# Patient Record
Sex: MALE | Race: BLACK OR AFRICAN AMERICAN | NOT HISPANIC OR LATINO | URBAN - METROPOLITAN AREA
[De-identification: names, ages, dates, MRNs, and addresses within clinical notes are randomized per-mention and may not be internally consistent; named-entity substitution may affect disease eponyms.]

---

## 2018-07-29 ENCOUNTER — INPATIENT (INPATIENT)
Facility: HOSPITAL | Age: 68
LOS: 1 days | Discharge: HOME | End: 2018-07-31
Attending: INTERNAL MEDICINE | Admitting: INTERNAL MEDICINE
Payer: MEDICARE

## 2018-07-29 VITALS
TEMPERATURE: 98 F | HEIGHT: 75 IN | SYSTOLIC BLOOD PRESSURE: 150 MMHG | RESPIRATION RATE: 24 BRPM | DIASTOLIC BLOOD PRESSURE: 90 MMHG | WEIGHT: 179.9 LBS | HEART RATE: 100 BPM

## 2018-07-29 DIAGNOSIS — Z29.9 ENCOUNTER FOR PROPHYLACTIC MEASURES, UNSPECIFIED: ICD-10-CM

## 2018-07-29 DIAGNOSIS — J44.1 CHRONIC OBSTRUCTIVE PULMONARY DISEASE WITH (ACUTE) EXACERBATION: ICD-10-CM

## 2018-07-29 DIAGNOSIS — Z72.0 TOBACCO USE: ICD-10-CM

## 2018-07-29 LAB
ALBUMIN SERPL ELPH-MCNC: 3.6 G/DL — SIGNIFICANT CHANGE UP (ref 3.3–5)
ALP SERPL-CCNC: 64 U/L — SIGNIFICANT CHANGE UP (ref 40–120)
ALT FLD-CCNC: 39 U/L — SIGNIFICANT CHANGE UP (ref 12–78)
ANION GAP SERPL CALC-SCNC: 13 MMOL/L — SIGNIFICANT CHANGE UP (ref 5–17)
APTT BLD: 34.3 SEC — SIGNIFICANT CHANGE UP (ref 27.5–37.4)
AST SERPL-CCNC: 43 U/L — HIGH (ref 15–37)
BASE EXCESS BLDA CALC-SCNC: -0.9 MMOL/L — SIGNIFICANT CHANGE UP (ref -2–2)
BASOPHILS # BLD AUTO: 0.05 K/UL — SIGNIFICANT CHANGE UP (ref 0–0.2)
BASOPHILS NFR BLD AUTO: 0.5 % — SIGNIFICANT CHANGE UP (ref 0–2)
BILIRUB SERPL-MCNC: 0.4 MG/DL — SIGNIFICANT CHANGE UP (ref 0.2–1.2)
BLOOD GAS COMMENTS: SIGNIFICANT CHANGE UP
BLOOD GAS COMMENTS: SIGNIFICANT CHANGE UP
BLOOD GAS SOURCE: SIGNIFICANT CHANGE UP
BUN SERPL-MCNC: 23 MG/DL — SIGNIFICANT CHANGE UP (ref 7–23)
CALCIUM SERPL-MCNC: 8.7 MG/DL — SIGNIFICANT CHANGE UP (ref 8.5–10.1)
CHLORIDE SERPL-SCNC: 106 MMOL/L — SIGNIFICANT CHANGE UP (ref 96–108)
CK MB CFR SERPL CALC: 5.7 NG/ML — HIGH (ref 0.5–3.6)
CO2 SERPL-SCNC: 22 MMOL/L — SIGNIFICANT CHANGE UP (ref 22–31)
CREAT SERPL-MCNC: 0.96 MG/DL — SIGNIFICANT CHANGE UP (ref 0.5–1.3)
EOSINOPHIL # BLD AUTO: 0.66 K/UL — HIGH (ref 0–0.5)
EOSINOPHIL NFR BLD AUTO: 6.8 % — HIGH (ref 0–6)
GLUCOSE SERPL-MCNC: 108 MG/DL — HIGH (ref 70–99)
HCO3 BLDA-SCNC: 23 MMOL/L — SIGNIFICANT CHANGE UP (ref 21–29)
HCT VFR BLD CALC: 43.1 % — SIGNIFICANT CHANGE UP (ref 39–50)
HGB BLD-MCNC: 14.2 G/DL — SIGNIFICANT CHANGE UP (ref 13–17)
HOROWITZ INDEX BLDA+IHG-RTO: 21 — SIGNIFICANT CHANGE UP
IMM GRANULOCYTES NFR BLD AUTO: 0.3 % — SIGNIFICANT CHANGE UP (ref 0–1.5)
INR BLD: 1.05 RATIO — SIGNIFICANT CHANGE UP (ref 0.88–1.16)
LYMPHOCYTES # BLD AUTO: 1.79 K/UL — SIGNIFICANT CHANGE UP (ref 1–3.3)
LYMPHOCYTES # BLD AUTO: 18.5 % — SIGNIFICANT CHANGE UP (ref 13–44)
MCHC RBC-ENTMCNC: 30.5 PG — SIGNIFICANT CHANGE UP (ref 27–34)
MCHC RBC-ENTMCNC: 32.9 GM/DL — SIGNIFICANT CHANGE UP (ref 32–36)
MCV RBC AUTO: 92.5 FL — SIGNIFICANT CHANGE UP (ref 80–100)
MONOCYTES # BLD AUTO: 0.92 K/UL — HIGH (ref 0–0.9)
MONOCYTES NFR BLD AUTO: 9.5 % — SIGNIFICANT CHANGE UP (ref 2–14)
NEUTROPHILS # BLD AUTO: 6.24 K/UL — SIGNIFICANT CHANGE UP (ref 1.8–7.4)
NEUTROPHILS NFR BLD AUTO: 64.4 % — SIGNIFICANT CHANGE UP (ref 43–77)
NT-PROBNP SERPL-SCNC: 104 PG/ML — SIGNIFICANT CHANGE UP (ref 0–125)
PCO2 BLDA: 40 MMHG — SIGNIFICANT CHANGE UP (ref 32–46)
PH BLD: 7.39 — SIGNIFICANT CHANGE UP (ref 7.35–7.45)
PLATELET # BLD AUTO: 225 K/UL — SIGNIFICANT CHANGE UP (ref 150–400)
PO2 BLDA: 76 MMHG — SIGNIFICANT CHANGE UP (ref 74–108)
POTASSIUM SERPL-MCNC: 3.9 MMOL/L — SIGNIFICANT CHANGE UP (ref 3.5–5.3)
POTASSIUM SERPL-SCNC: 3.9 MMOL/L — SIGNIFICANT CHANGE UP (ref 3.5–5.3)
PROT SERPL-MCNC: 6.8 GM/DL — SIGNIFICANT CHANGE UP (ref 6–8.3)
PROTHROM AB SERPL-ACNC: 11.5 SEC — SIGNIFICANT CHANGE UP (ref 9.8–12.7)
RBC # BLD: 4.66 M/UL — SIGNIFICANT CHANGE UP (ref 4.2–5.8)
RBC # FLD: 15.2 % — HIGH (ref 10.3–14.5)
SAO2 % BLDA: 94 % — SIGNIFICANT CHANGE UP (ref 92–96)
SODIUM SERPL-SCNC: 141 MMOL/L — SIGNIFICANT CHANGE UP (ref 135–145)
TROPONIN I SERPL-MCNC: <.015 NG/ML — SIGNIFICANT CHANGE UP (ref 0.01–0.04)
WBC # BLD: 9.69 K/UL — SIGNIFICANT CHANGE UP (ref 3.8–10.5)
WBC # FLD AUTO: 9.69 K/UL — SIGNIFICANT CHANGE UP (ref 3.8–10.5)

## 2018-07-29 PROCEDURE — 12345: CPT

## 2018-07-29 PROCEDURE — 99223 1ST HOSP IP/OBS HIGH 75: CPT

## 2018-07-29 PROCEDURE — 93010 ELECTROCARDIOGRAM REPORT: CPT

## 2018-07-29 PROCEDURE — 99291 CRITICAL CARE FIRST HOUR: CPT

## 2018-07-29 PROCEDURE — 71045 X-RAY EXAM CHEST 1 VIEW: CPT | Mod: 26

## 2018-07-29 RX ORDER — HEPARIN SODIUM 5000 [USP'U]/ML
5000 INJECTION INTRAVENOUS; SUBCUTANEOUS EVERY 8 HOURS
Qty: 0 | Refills: 0 | Status: DISCONTINUED | OUTPATIENT
Start: 2018-07-29 | End: 2018-07-31

## 2018-07-29 RX ORDER — NICOTINE POLACRILEX 2 MG
1 GUM BUCCAL DAILY
Qty: 0 | Refills: 0 | Status: DISCONTINUED | OUTPATIENT
Start: 2018-07-29 | End: 2018-07-31

## 2018-07-29 RX ORDER — IPRATROPIUM/ALBUTEROL SULFATE 18-103MCG
3 AEROSOL WITH ADAPTER (GRAM) INHALATION ONCE
Qty: 0 | Refills: 0 | Status: COMPLETED | OUTPATIENT
Start: 2018-07-29 | End: 2018-07-29

## 2018-07-29 RX ORDER — MAGNESIUM SULFATE 500 MG/ML
2 VIAL (ML) INJECTION ONCE
Qty: 0 | Refills: 0 | Status: COMPLETED | OUTPATIENT
Start: 2018-07-29 | End: 2018-07-29

## 2018-07-29 RX ORDER — IPRATROPIUM/ALBUTEROL SULFATE 18-103MCG
3 AEROSOL WITH ADAPTER (GRAM) INHALATION EVERY 6 HOURS
Qty: 0 | Refills: 0 | Status: DISCONTINUED | OUTPATIENT
Start: 2018-07-29 | End: 2018-07-31

## 2018-07-29 RX ADMIN — Medication 3 MILLILITER(S): at 12:10

## 2018-07-29 RX ADMIN — Medication 3 MILLILITER(S): at 23:19

## 2018-07-29 RX ADMIN — HEPARIN SODIUM 5000 UNIT(S): 5000 INJECTION INTRAVENOUS; SUBCUTANEOUS at 13:01

## 2018-07-29 RX ADMIN — Medication 40 MILLIGRAM(S): at 17:08

## 2018-07-29 RX ADMIN — Medication 1 PATCH: at 12:52

## 2018-07-29 RX ADMIN — HEPARIN SODIUM 5000 UNIT(S): 5000 INJECTION INTRAVENOUS; SUBCUTANEOUS at 21:03

## 2018-07-29 RX ADMIN — Medication 40 MILLIGRAM(S): at 09:04

## 2018-07-29 RX ADMIN — Medication 3 MILLILITER(S): at 05:14

## 2018-07-29 RX ADMIN — Medication 50 GRAM(S): at 05:24

## 2018-07-29 RX ADMIN — Medication 3 MILLILITER(S): at 17:11

## 2018-07-29 NOTE — ED ADULT NURSE NOTE - CHPI ED SYMPTOMS POS
COUGH/labored breathing,came in on Bipap, awake alert oriented/WHEEZING/CHEST CONGESTION/SHORTNESS OF BREATH

## 2018-07-29 NOTE — CONSULT NOTE ADULT - SUBJECTIVE AND OBJECTIVE BOX
Patient is a 68y old  Male who presents with a chief complaint of COPD exacerbation (29 Jul 2018 07:16)    HPI:  68 year male with HTN, COPD, Active tobacco abuse since age 26, Marijuana abuse.  presented  to ED with complaint of wheezing and SOB with cough.  Ddenies fever, chills, recent travel, sick contacts, prior intubations, recent hospitalizations.  He says he was told his lung issues were the result of toxic exposures at first but was then diagnosed with COPD by [place] PM[place]      PAST MEDICAL & SURGICAL HISTORY:  Hypertension, unspecified type  Chronic obstructive pulmonary disease, unspecified COPD type  No significant past surgical history    FAMILY HISTORY:  No pertinent family history in first degree relatives    SOCIAL HISTORY: BMI (kg/m2): 23.1 . active smoker.    Allergies  No Known Allergies    MEDICATIONS  (STANDING):  ALBUTerol/ipratropium for Nebulization 3 milliLiter(s) Nebulizer every 6 hours  heparin  Injectable 5000 Unit(s) SubCutaneous every 8 hours  methylPREDNISolone sodium succinate Injectable 40 milliGRAM(s) IV Push every 8 hours  nicotine -  14 mG/24Hr(s) Patch 1 patch Transdermal daily    REVIEW OF SYSTEMS:    Constitutional:            No fever, weight loss or fatigue  HEENT:                       No difficulty hearing, tinnitus, vertigo; No sinus or throat pain  Respiratory:                 sob and cough  Cardiovascular:           No chest pain, palpitations  Gastrointestinal:        No abdominal or epigastric pain. No N/V/diarrhea or hematemesis  Genitourinary:            No dysuria, frequency, hematuria or incontinence  SKIN:                             no rash  Musculoskeletal:        No joint pain or swelling  Extremities:                No swelling  Neurological:              No headaches  Psychiatric:                 No depression, anxiety    MACRA & MIPS;  Vaccines - Influenza: yes and Pneumovax: Yes  BMI:         normal  Tobacco:    yes  Blood Presssure Screening / Control of:  151/82  Current Medications Reviewed:    Vital Signs Last 24 Hrs  T(C): 36.7 (29 Jul 2018 09:47), Max: 36.7 (29 Jul 2018 04:47)  T(F): 98 (29 Jul 2018 09:47), Max: 98.1 (29 Jul 2018 04:47)  HR: 86 (29 Jul 2018 09:47) (70 - 100)  BP: 150/92 (29 Jul 2018 09:47) (145/78 - 151/82)  BP(mean): --  RR: 20 (29 Jul 2018 09:47) (14 - 24)  SpO2: 96% (29 Jul 2018 09:47) (95% - 98%)    PHYSICAL EXAM:  GEN:         Awake, responsive and comfortable.  HEENT:    Normal.    RESP:     decreased air entry.  CVS:          Regular rate and rhythm.   ABD:         Soft, non-tender, non-distended;   :             No costovertebral angle tenderness  SKIN:           Warm and dry.  EXTR:            No clubbing, cyanosis or edema  CNS:              Intact sensory and motor function.  PSYCH:        cooperative, no anxiety or depression    LABS:                        14.2   9.69  )-----------( 225      ( 29 Jul 2018 05:17 )             43.1     07-29    141  |  106  |  23  ----------------------------<  108<H>  3.9   |  22  |  0.96    Ca    8.7      29 Jul 2018 06:13    TPro  6.8  /  Alb  3.6  /  TBili  0.4  /  DBili  x   /  AST  43<H>  /  ALT  39  /  AlkPhos  64  07-29    PT/INR - ( 29 Jul 2018 06:13 )   PT: 11.5 sec;   INR: 1.05 ratio      PTT - ( 29 Jul 2018 06:13 )  PTT:34.3 sec  07-29 @ 05:12  pH: 7.39  pCO2: 40  pO2: 76  SaO2: 94    EKG:  Sinus rhythm, RBBBB    RADIOLOGY & ADDITIONAL STUDIES:    ASSESSMENT AND PLAN:  ·	SOB.  ·	Acute COPD exacerbation.  ·	Active tobacco abuse.  ·	Marijuana abuse.  ·	HTN.    Continue nebulizer and steroids.  Counselled for smoking cessation.  PFT out pt.

## 2018-07-29 NOTE — H&P ADULT - HISTORY OF PRESENT ILLNESS
68 year old current every day smoker with PMH COPD presents to ED with complaint of wheezing and SOB with cough.  Patient denies fever, chills, recent travel, sick contacts, prior intubations, recent hospitalizations.  He says he was told his lung issues were the result of toxic exposures at first but was then diagnosed with COPD by [place]      In the ED, pt responded to duoneb, BiPAP.

## 2018-07-29 NOTE — PATIENT PROFILE ADULT. - HAS THE PATIENT HAD A SIGNIFICANT CHANGE IN FUNCTIONAL STATUS DUE TO CVA, HEAD TRAUMA, ORTHOPEDIC TRAUMA/SURGERY, OR FALL, WITH THE WEEK PRIOR TO ADMISSION
Pt was referred to bariactric surgery but she never completed the necessary paper work for her appt and she has not contacted us regarding this    no

## 2018-07-29 NOTE — CHART NOTE - NSCHARTNOTEFT_GEN_A_CORE
68 year old current every day smoker with PMH COPD presents to ED with complaint of wheezing and SOB with cough, admitted for COPD exacerbation  Patient seen and examined at bed side in NAD, reports improved SOB, Lungs: mild expiratory wheezing other physical exam wnl.  Continue bronchodilators, steroids, monitor blood sugars while on steroid, supplemental oxygen as needed.  smoking counselling, nicotine patch  follow pulmonary consult

## 2018-07-29 NOTE — H&P ADULT - PROBLEM SELECTOR PLAN 1
Titrate BiPAP, then O2 via NC@ 2L/Min, keep sat 88-92% at all times.  Solumedrol 40mg IVPB Q8hrs, monitor finger stick glucose  Albuterol/Atrovent 1 unit neb Q6hrs.  Pulmonary consult

## 2018-07-29 NOTE — ED PROVIDER NOTE - PHYSICAL EXAMINATION
Gen: mild respiratory distress, awake, alert. mentating and speaking  HEENT: Mucous membranes moist, pink conjunctivae, EOMI  CV: RRR, nl s1/s2.  Resp: decreased air movement with faint expiratory wheezes, prolonged expiratory phase, speaking full sentences, mild retractions  GI: Abdomen soft, NT, ND. No rebound, no guarding  : No CVAT  Neuro: A&O x 3, moving all 4 extremities  MSK: No spine or joint tenderness to palpation. No edema b/l LE.   Skin: No rashes. intact and perfused.

## 2018-07-29 NOTE — ED ADULT TRIAGE NOTE - CHIEF COMPLAINT QUOTE
BIBA, difficulty breathing.  per EMS, pt has had SOB, wheezing all day saturday.  pt was having a cigarette about 1 hour ago and was unable to catch his breathe.  pt is on Bipap,  #20guage L-AC.  12mg Dexamethasone IV given, 0.3 epi IM given and 3 duonebs given by EMS

## 2018-07-29 NOTE — H&P ADULT - NSHPLABSRESULTS_GEN_ALL_CORE
Vital Signs Last 24 Hrs  T(C): 36.7 (29 Jul 2018 04:47), Max: 36.7 (29 Jul 2018 04:47)  T(F): 98.1 (29 Jul 2018 04:47), Max: 98.1 (29 Jul 2018 04:47)  HR: 90 (29 Jul 2018 05:24) (90 - 100)  BP: 151/82 (29 Jul 2018 05:24) (150/90 - 151/82)  BP(mean): --  RR: 20 (29 Jul 2018 05:24) (20 - 24)  SpO2: 95% (29 Jul 2018 05:24) (95% - 98%)        LABS:                        14.2   9.69  )-----------( 225      ( 29 Jul 2018 05:17 )             43.1     07-29    141  |  106  |  23  ----------------------------<  108<H>  3.9   |  22  |  0.96    Ca    8.7      29 Jul 2018 06:13    TPro  6.8  /  Alb  3.6  /  TBili  0.4  /  DBili  x   /  AST  43<H>  /  ALT  39  /  AlkPhos  64  07-29    PT/INR - ( 29 Jul 2018 06:13 )   PT: 11.5 sec;   INR: 1.05 ratio         PTT - ( 29 Jul 2018 06:13 )  PTT:34.3 sec      RADIOLOGY & ADDITIONAL STUDIES:    CXR:  clear lungs

## 2018-07-29 NOTE — ED PROVIDER NOTE - MEDICAL DECISION MAKING DETAILS
likely copd exacberation, improving with meds, will add magnesium, continue nebs and bipap, xr r/o pna. admission.

## 2018-07-29 NOTE — H&P ADULT - NSHPPHYSICALEXAM_GEN_ALL_CORE
GENERAL: NAD, well-groomed, well-developed, no distress on BiPAP  HEAD:  Atraumatic, Normocephalic  EYES: EOMI, PERRLA, conjunctiva and sclera clear  ENMT: No tonsillar erythema, exudates, or enlargement; Moist mucous membranes, No lesions  NECK: Supple, No JVD, Normal thyroid  NERVOUS SYSTEM:  Alert & Oriented X3, Good concentration  CHEST/LUNG: Rhonchi bilaterally; No rales, wheezing, or rubs  HEART: Regular rate and rhythm; No murmurs, rubs, or gallops  ABDOMEN: Soft, Nontender, Nondistended; Bowel sounds present  EXTREMITIES: no clubbing, cyanosis, or edema  SKIN: no rashes or lesions   PSYCH: normal affect and behavior

## 2018-07-30 LAB
ANION GAP SERPL CALC-SCNC: 11 MMOL/L — SIGNIFICANT CHANGE UP (ref 5–17)
BUN SERPL-MCNC: 19 MG/DL — SIGNIFICANT CHANGE UP (ref 7–23)
CALCIUM SERPL-MCNC: 8.8 MG/DL — SIGNIFICANT CHANGE UP (ref 8.5–10.1)
CHLORIDE SERPL-SCNC: 107 MMOL/L — SIGNIFICANT CHANGE UP (ref 96–108)
CO2 SERPL-SCNC: 22 MMOL/L — SIGNIFICANT CHANGE UP (ref 22–31)
CREAT SERPL-MCNC: 1.23 MG/DL — SIGNIFICANT CHANGE UP (ref 0.5–1.3)
GLUCOSE BLDC GLUCOMTR-MCNC: 132 MG/DL — HIGH (ref 70–99)
GLUCOSE BLDC GLUCOMTR-MCNC: 150 MG/DL — HIGH (ref 70–99)
GLUCOSE BLDC GLUCOMTR-MCNC: 154 MG/DL — HIGH (ref 70–99)
GLUCOSE SERPL-MCNC: 236 MG/DL — HIGH (ref 70–99)
HCT VFR BLD CALC: 42.1 % — SIGNIFICANT CHANGE UP (ref 39–50)
HGB BLD-MCNC: 14.3 G/DL — SIGNIFICANT CHANGE UP (ref 13–17)
MCHC RBC-ENTMCNC: 30.9 PG — SIGNIFICANT CHANGE UP (ref 27–34)
MCHC RBC-ENTMCNC: 34 GM/DL — SIGNIFICANT CHANGE UP (ref 32–36)
MCV RBC AUTO: 90.9 FL — SIGNIFICANT CHANGE UP (ref 80–100)
NRBC # BLD: 0 /100 WBCS — SIGNIFICANT CHANGE UP (ref 0–0)
PLATELET # BLD AUTO: 230 K/UL — SIGNIFICANT CHANGE UP (ref 150–400)
POTASSIUM SERPL-MCNC: 4 MMOL/L — SIGNIFICANT CHANGE UP (ref 3.5–5.3)
POTASSIUM SERPL-SCNC: 4 MMOL/L — SIGNIFICANT CHANGE UP (ref 3.5–5.3)
RBC # BLD: 4.63 M/UL — SIGNIFICANT CHANGE UP (ref 4.2–5.8)
RBC # FLD: 14.5 % — SIGNIFICANT CHANGE UP (ref 10.3–14.5)
SODIUM SERPL-SCNC: 140 MMOL/L — SIGNIFICANT CHANGE UP (ref 135–145)
WBC # BLD: 12.75 K/UL — HIGH (ref 3.8–10.5)
WBC # FLD AUTO: 12.75 K/UL — HIGH (ref 3.8–10.5)

## 2018-07-30 PROCEDURE — 99233 SBSQ HOSP IP/OBS HIGH 50: CPT

## 2018-07-30 RX ADMIN — Medication 3 MILLILITER(S): at 23:33

## 2018-07-30 RX ADMIN — Medication 1 PATCH: at 11:47

## 2018-07-30 RX ADMIN — HEPARIN SODIUM 5000 UNIT(S): 5000 INJECTION INTRAVENOUS; SUBCUTANEOUS at 05:04

## 2018-07-30 RX ADMIN — Medication 3 MILLILITER(S): at 17:18

## 2018-07-30 RX ADMIN — HEPARIN SODIUM 5000 UNIT(S): 5000 INJECTION INTRAVENOUS; SUBCUTANEOUS at 21:26

## 2018-07-30 RX ADMIN — Medication 40 MILLIGRAM(S): at 03:53

## 2018-07-30 RX ADMIN — Medication 40 MILLIGRAM(S): at 17:28

## 2018-07-30 RX ADMIN — HEPARIN SODIUM 5000 UNIT(S): 5000 INJECTION INTRAVENOUS; SUBCUTANEOUS at 13:06

## 2018-07-30 RX ADMIN — Medication 3 MILLILITER(S): at 11:21

## 2018-07-30 RX ADMIN — Medication 3 MILLILITER(S): at 05:21

## 2018-07-30 NOTE — PROGRESS NOTE ADULT - SUBJECTIVE AND OBJECTIVE BOX
Patient is a 68y old  Male who presents with a chief complaint of COPD exacerbation (29 Jul 2018 07:16)      INTERVAL HPI/ OVERNIGHT EVENTS: Pt was seen and examined at bedside today, No significant overnight events, pt admits that SOB and wheezing is improving.      MEDICATIONS  (STANDING):  ALBUTerol/ipratropium for Nebulization 3 milliLiter(s) Nebulizer every 6 hours  heparin  Injectable 5000 Unit(s) SubCutaneous every 8 hours  methylPREDNISolone sodium succinate Injectable 40 milliGRAM(s) IV Push two times a day  nicotine -  14 mG/24Hr(s) Patch 1 patch Transdermal daily    MEDICATIONS  (PRN):      Allergies    No Known Allergies    Intolerances        REVIEW OF SYSTEMS:    Unable to examine due to [ ] Altered Mental Status [ ] Advanced Dementia [ ] Expressive Aphasia [ ] Non-verbal patient    CONSTITUTIONAL: No fever, NO generalized weakness/Fatigue, No weight loss  EYES: No eye pain, visual disturbances, or discharge  ENMT:  No difficulty hearing, tinnitus, vertigo; No sinus or throat pain  NECK: No pain or stiffness  RESPIRATORY: + shortness of breath, + cough, + wheezing, no sputum or hemoptysis   CARDIOVASCULAR: No chest pain, palpitations, or leg swelling  GASTROINTESTINAL: No abdominal pain. No nausea, vomiting, diarrhea or constipation. No melena or hematochezia.  GENITOURINARY: No dysuria, frequency, hematuria, or incontinence  NEUROLOGICAL: No headaches, Dizziness, memory loss, loss of strength, numbness, or tremors  SKIN: No itching, burning, rashes, or lesions   MUSCULOSKELETAL: No joint pain or swelling; No muscle, back, or extremity pain  PSYCHIATRIC: No depression, anxiety, mood swings, or difficulty sleeping  HEME/LYMPH: No easy bruising, or bleeding gums      Vital Signs Last 24 Hrs  T(C): 36.1 (30 Jul 2018 16:20), Max: 36.6 (29 Jul 2018 23:17)  T(F): 97 (30 Jul 2018 16:20), Max: 97.8 (29 Jul 2018 23:17)  HR: 81 (30 Jul 2018 17:31) (66 - 97)  BP: 97/61 (30 Jul 2018 16:20) (97/61 - 145/60)  BP(mean): --  RR: 16 (30 Jul 2018 16:20) (16 - 18)  SpO2: 96% (30 Jul 2018 17:31) (75% - 99%)    PHYSICAL EXAM:  GENERAL: NAD, well-developed, well-groomed  HEAD:  Atraumatic, Normocephalic  EYES: conjunctiva and sclera clear  ENMT: Moist mucous membranes  NECK: Supple, No JVD, Normal thyroid  CHEST/LUNG: Clear to Auscultation bilaterally; No rales, rhonchi, wheezing, or rubs  HEART: Regular rate and rhythm; No murmurs, rubs, or gallops  ABDOMEN: Soft, Nontender, Nondistended; Bowel sounds present  EXTREMITIES:  2+ Peripheral Pulses, No clubbing, cyanosis, or edema  SKIN: No rashes or lesions  NERVOUS SYSTEM:  Alert & Oriented X3, Good concentration; Motor Strength 5/5 B/L upper and lower extremities    LABS:                        14.3   12.75 )-----------( 230      ( 30 Jul 2018 08:00 )             42.1     07-30    140  |  107  |  19  ----------------------------<  236<H>  4.0   |  22  |  1.23    Ca    8.8      30 Jul 2018 08:00    TPro  6.8  /  Alb  3.6  /  TBili  0.4  /  DBili  x   /  AST  43<H>  /  ALT  39  /  AlkPhos  64  07-29    PT/INR - ( 29 Jul 2018 06:13 )   PT: 11.5 sec;   INR: 1.05 ratio         PTT - ( 29 Jul 2018 06:13 )  PTT:34.3 sec    CAPILLARY BLOOD GLUCOSE      POCT Blood Glucose.: 132 mg/dL (30 Jul 2018 15:57)  POCT Blood Glucose.: 154 mg/dL (30 Jul 2018 11:42)  POCT Blood Glucose.: 119 mg/dL (30 Jul 2018 05:00)  POCT Blood Glucose.: 148 mg/dL (29 Jul 2018 21:06)          RADIOLOGY & ADDITIONAL TESTS:          Imaging Personally Reviewed:  [x ] YES  [ ] NO    Consultant(s) Notes Reviewed:  [x ] YES  [ ] NO    Care Discussed with Consultants/Other Providers [x ] YES  [ ] NO

## 2018-07-30 NOTE — PROGRESS NOTE ADULT - SUBJECTIVE AND OBJECTIVE BOX
INTERVAL HPI:  68 year male with HTN, COPD, Active tobacco abuse since age 26, Marijuana abuse.  presented  to ED with complaint of wheezing and SOB with cough.  Ddenies fever, chills, recent travel, sick contacts, prior intubations, recent hospitalizations.  He says he was told his lung issues were the result of toxic exposures at first but was then diagnosed with COPD by his PMD .      OVERNIGHT EVENTS:  Awake, comfortable, feels better.    Vital Signs Last 24 Hrs  T(C): 36.1 (30 Jul 2018 16:20), Max: 36.6 (29 Jul 2018 23:17)  T(F): 97 (30 Jul 2018 16:20), Max: 97.8 (29 Jul 2018 23:17)  HR: 66 (30 Jul 2018 16:20) (66 - 97)  BP: 97/61 (30 Jul 2018 16:20) (97/61 - 145/60)  BP(mean): --  RR: 16 (30 Jul 2018 16:20) (16 - 18)  SpO2: 99% (30 Jul 2018 16:20) (75% - 99%)    PHYSICAL EXAM:  GEN:         Awake, responsive and comfortable.  HEENT:    Normal.    RESP:       no wheezing.  CVS:          Regular rate and rhythm.   ABD:         Soft, non-tender, non-distended;     MEDICATIONS  (STANDING):  ALBUTerol/ipratropium for Nebulization 3 milliLiter(s) Nebulizer every 6 hours  heparin  Injectable 5000 Unit(s) SubCutaneous every 8 hours  methylPREDNISolone sodium succinate Injectable 40 milliGRAM(s) IV Push two times a day  nicotine -  14 mG/24Hr(s) Patch 1 patch Transdermal daily    LABS:                        14.3   12.75 )-----------( 230      ( 30 Jul 2018 08:00 )             42.1     07-30    140  |  107  |  19  ----------------------------<  236<H>  4.0   |  22  |  1.23    Ca    8.8      30 Jul 2018 08:00    TPro  6.8  /  Alb  3.6  /  TBili  0.4  /  DBili  x   /  AST  43<H>  /  ALT  39  /  AlkPhos  64  07-29    PT/INR - ( 29 Jul 2018 06:13 )   PT: 11.5 sec;   INR: 1.05 ratio      PTT - ( 29 Jul 2018 06:13 )  PTT:34.3 sec  07-29 @ 05:12  pH: 7.39  pCO2: 40  pO2: 76  SaO2: 94  ASSESSMENT AND PLAN:  ·	SOB.  ·	Acute COPD exacerbation.  ·	Active tobacco abuse.  ·	Marijuana abuse.  ·	HTN.    On steroids and nebulizer.  PFT out pt.

## 2018-07-30 NOTE — PROGRESS NOTE ADULT - ASSESSMENT
68 year old current every day smoker with PMH COPD presents to ED with complaint of wheezing and SOB with cough.  Patient will require admission for at least 2 midnights as detailed below:    IMPROVE VTE Individual Risk Assessment          RISK                                                          Points    [  ] Previous VTE                                                3    [  ] Thrombophilia                                             2    [  ] Lower limb paralysis                                    2        (unable to hold up >15 seconds)      [  ] Current Cancer                                             2         (within 6 months)    [ x ] Immobilization > 24 hrs                              1    [  ] ICU/CCU stay > 24 hours                            1    [ x ] Age > 60                                                    1    IMPROVE VTE Score _____2____

## 2018-07-31 VITALS — OXYGEN SATURATION: 94 %

## 2018-07-31 LAB
ANION GAP SERPL CALC-SCNC: 7 MMOL/L — SIGNIFICANT CHANGE UP (ref 5–17)
BUN SERPL-MCNC: 22 MG/DL — SIGNIFICANT CHANGE UP (ref 7–23)
CALCIUM SERPL-MCNC: 8.4 MG/DL — LOW (ref 8.5–10.1)
CHLORIDE SERPL-SCNC: 106 MMOL/L — SIGNIFICANT CHANGE UP (ref 96–108)
CO2 SERPL-SCNC: 27 MMOL/L — SIGNIFICANT CHANGE UP (ref 22–31)
CREAT SERPL-MCNC: 1.03 MG/DL — SIGNIFICANT CHANGE UP (ref 0.5–1.3)
GLUCOSE BLDC GLUCOMTR-MCNC: 117 MG/DL — HIGH (ref 70–99)
GLUCOSE BLDC GLUCOMTR-MCNC: 210 MG/DL — HIGH (ref 70–99)
GLUCOSE SERPL-MCNC: 121 MG/DL — HIGH (ref 70–99)
POTASSIUM SERPL-MCNC: 4.4 MMOL/L — SIGNIFICANT CHANGE UP (ref 3.5–5.3)
POTASSIUM SERPL-SCNC: 4.4 MMOL/L — SIGNIFICANT CHANGE UP (ref 3.5–5.3)
SODIUM SERPL-SCNC: 140 MMOL/L — SIGNIFICANT CHANGE UP (ref 135–145)

## 2018-07-31 PROCEDURE — 99239 HOSP IP/OBS DSCHRG MGMT >30: CPT

## 2018-07-31 RX ORDER — ALBUTEROL 90 UG/1
2 AEROSOL, METERED ORAL
Qty: 1 | Refills: 0 | OUTPATIENT
Start: 2018-07-31 | End: 2018-08-29

## 2018-07-31 RX ORDER — NICOTINE POLACRILEX 2 MG
1 GUM BUCCAL
Qty: 30 | Refills: 0 | OUTPATIENT
Start: 2018-07-31 | End: 2018-08-29

## 2018-07-31 RX ORDER — ALBUTEROL 90 UG/1
2 AEROSOL, METERED ORAL
Qty: 0 | Refills: 0 | COMMUNITY

## 2018-07-31 RX ADMIN — Medication 1 PATCH: at 11:42

## 2018-07-31 RX ADMIN — Medication 1 PATCH: at 11:46

## 2018-07-31 RX ADMIN — HEPARIN SODIUM 5000 UNIT(S): 5000 INJECTION INTRAVENOUS; SUBCUTANEOUS at 13:21

## 2018-07-31 RX ADMIN — Medication 3 MILLILITER(S): at 06:15

## 2018-07-31 RX ADMIN — Medication 3 MILLILITER(S): at 11:01

## 2018-07-31 RX ADMIN — HEPARIN SODIUM 5000 UNIT(S): 5000 INJECTION INTRAVENOUS; SUBCUTANEOUS at 05:50

## 2018-07-31 RX ADMIN — Medication 3 MILLILITER(S): at 17:04

## 2018-07-31 RX ADMIN — Medication 40 MILLIGRAM(S): at 05:50

## 2018-07-31 NOTE — DISCHARGE NOTE ADULT - CARE PLAN
Principal Discharge DX:	COPD exacerbation  Goal:	Acute exacerbation resolved.  Assessment and plan of treatment:	Continue with tapering oral steroids over next 5 days.   Take ventolin as needed for shortness of breath/wheezing.  Follow up with Pulmonary (Dr. Acevedo) for outpatient pulmonary function test.  Secondary Diagnosis:	Tobacco abuse  Goal:	Quit smoking  Assessment and plan of treatment:	Nicotine patch  Call [facility] Smoking Cessation [phone]  Secondary Diagnosis:	Hypertension, unspecified type  Goal:	Continue with Norvasc

## 2018-07-31 NOTE — DISCHARGE NOTE ADULT - HOSPITAL COURSE
68 year old current every day smoker with PMH COPD presents to ED with complaint of wheezing and SOB with cough.  Patient denies fever, chills, recent travel, sick contacts, prior intubations, recent hospitalizations.  He says he was told his lung issues were the result of toxic exposures at first but was then diagnosed with COPD by [place]      In the ED, pt responded to duoneb, BiPAP. 68 year old current every day smoker with PMH COPD presents to ED with complaint of wheezing and SOB with cough.  Patient denies fever, chills, recent travel, sick contacts, prior intubations, recent hospitalizations.  He says he was told his lung issues were the result of toxic exposures at first but was then diagnosed with COPD by [place] PM[place]      In the ED, CXR: No acute changes, ABG: wnl, pt responded to duoneb, IV solu-medrol and BiPAP.    The patient was admitted to medical bed. Pulmonary was consulted and followed the patient. The patient's acute exacerbation improved and IV steroids were tapered during hospital course. He was given nicotine patch. The patient has no SOB on room air. He will be discharged with tapering oral steroids and will follow up with pulmonary for pulmonary function test.

## 2018-07-31 NOTE — PROGRESS NOTE ADULT - SUBJECTIVE AND OBJECTIVE BOX
INTERVAL HPI:  68 year male with HTN, COPD, Active tobacco abuse since age 26, Marijuana abuse.  presented  to ED with complaint of wheezing and SOB with cough.  Ddenies fever, chills, recent travel, sick contacts, prior intubations, recent hospitalizations.  He says he was told his lung issues were the result of toxic exposures at first but was then diagnosed with COPD by his PMD .     OVERNIGHT EVENTS:  Feels better.    Vital Signs Last 24 Hrs  T(C): 36.1 (31 Jul 2018 10:06), Max: 36.4 (30 Jul 2018 11:16)  T(F): 97 (31 Jul 2018 10:06), Max: 97.6 (30 Jul 2018 11:16)  HR: 79 (31 Jul 2018 10:06) (55 - 87)  BP: 148/76 (31 Jul 2018 10:06) (97/61 - 148/76)  BP(mean): --  RR: 16 (31 Jul 2018 10:06) (16 - 18)  SpO2: 93% (31 Jul 2018 10:06) (93% - 99%)    PHYSICAL EXAM:  GEN:         Awake, responsive and comfortable.  HEENT:    Normal.    RESP:        no wheezing.  CVS:             Regular rate and rhythm.   ABD:         Soft, non-tender, non-distended;     MEDICATIONS  (STANDING):  ALBUTerol/ipratropium for Nebulization 3 milliLiter(s) Nebulizer every 6 hours  heparin  Injectable 5000 Unit(s) SubCutaneous every 8 hours  nicotine -  14 mG/24Hr(s) Patch 1 patch Transdermal daily    LABS:                        14.3   12.75 )-----------( 230      ( 30 Jul 2018 08:00 )             42.1     07-31    140  |  106  |  22  ----------------------------<  121<H>  4.4   |  27  |  1.03    Ca    8.4<L>      31 Jul 2018 06:59    07-29 @ 05:12  pH: 7.39  pCO2: 40  pO2: 76  SaO2: 94    ASSESSMENT AND PLAN:  ·	SOB.  ·	Acute COPD exacerbation.  ·	Active tobacco abuse.  ·	Marijuana abuse.  ·	HTN.    May discharge on tapering steroids, Symbicort and as needed bronchodilators.  Smoking cessation.  PFT out pt.

## 2018-07-31 NOTE — DISCHARGE NOTE ADULT - CARE PROVIDER_API CALL
Asia Acevedo), Medicine  [address]  [address]  [place] [address]  Phone: [phone]  Fax: [phone]

## 2018-07-31 NOTE — DISCHARGE NOTE ADULT - MEDICATION SUMMARY - MEDICATIONS TO TAKE
I will START or STAY ON the medications listed below when I get home from the hospital:    traMADol 50 mg oral tablet  -- 50 milligram(s) by mouth 3 times a day, As Needed  -- Indication: For Pain    amLODIPine 10 mg oral tablet  -- 1 tab(s) by mouth once a day  -- Indication: For Hypertension, unspecified type I will START or STAY ON the medications listed below when I get home from the hospital:    predniSONE 10 mg oral tablet  -- 5 tab(s) oral - by mouth once a day x 1 days  4 tab(s) oral - by mouth once a day x 1 days  3 tab(s) oral - by mouth once a day x 1 days  2 tab(s) oral - by mouth once a day x 1 days  1 tab(s) oral - by mouth once a day x 1 days  -- It is very important that you take or use this exactly as directed.  Do not skip doses or discontinue unless directed by your doctor.  Obtain medical advice before taking any non-prescription drugs as some may affect the action of this medication.  Take with food or milk.    -- Indication: For COPD EXACERBATION    traMADol 50 mg oral tablet  -- 50 milligram(s) by mouth 3 times a day, As Needed  -- Indication: For Pain    Ventolin HFA 90 mcg/inh inhalation aerosol  -- 2 puff(s) inhaled 4 times a day, As Needed -for shortness of breath and/or wheezing   -- Indication: For COPD EXACERBATION    amLODIPine 10 mg oral tablet  -- 1 tab(s) by mouth once a day  -- Indication: For Hypertension, unspecified type    nicotine 14 mg/24 hr transdermal film, extended release  -- 1 patch by transdermal patch once a day   -- Indication: For Tobacco abuse

## 2018-07-31 NOTE — DISCHARGE NOTE ADULT - PATIENT PORTAL LINK FT
You can access the [facility] Patient Portal, offered by [facility], by registering with the following website: [email or web address]

## 2018-07-31 NOTE — DISCHARGE NOTE ADULT - PLAN OF CARE
Acute exacerbation resolved. Continue with tapering oral steroids over next 5 days.   Take ventolin as needed for shortness of breath/wheezing.  Follow up with Pulmonary (Dr. Acevedo) for outpatient pulmonary function test. Quit smoking Nicotine patch  Call [facility] Smoking Cessation [phone] Continue with Norvasc

## 2018-08-06 DIAGNOSIS — Z72.0 TOBACCO USE: ICD-10-CM

## 2018-08-06 DIAGNOSIS — F12.99 CANNABIS USE, UNSPECIFIED WITH UNSPECIFIED CANNABIS-INDUCED DISORDER: ICD-10-CM

## 2018-08-06 DIAGNOSIS — J44.1 CHRONIC OBSTRUCTIVE PULMONARY DISEASE WITH (ACUTE) EXACERBATION: ICD-10-CM

## 2018-08-06 DIAGNOSIS — Z79.51 LONG TERM (CURRENT) USE OF INHALED STEROIDS: ICD-10-CM

## 2018-08-06 DIAGNOSIS — I10 ESSENTIAL (PRIMARY) HYPERTENSION: ICD-10-CM

## 2018-09-03 ENCOUNTER — INPATIENT (INPATIENT)
Facility: HOSPITAL | Age: 68
LOS: 1 days | Discharge: HOME | End: 2018-09-05
Attending: INTERNAL MEDICINE | Admitting: INTERNAL MEDICINE
Payer: MEDICARE

## 2018-09-03 VITALS
HEIGHT: 71 IN | DIASTOLIC BLOOD PRESSURE: 84 MMHG | WEIGHT: 179.9 LBS | SYSTOLIC BLOOD PRESSURE: 143 MMHG | TEMPERATURE: 98 F | OXYGEN SATURATION: 96 % | RESPIRATION RATE: 18 BRPM | HEART RATE: 82 BPM

## 2018-09-03 LAB
ALBUMIN SERPL ELPH-MCNC: 3.2 G/DL — LOW (ref 3.3–5)
ALP SERPL-CCNC: 74 U/L — SIGNIFICANT CHANGE UP (ref 40–120)
ALT FLD-CCNC: 55 U/L — SIGNIFICANT CHANGE UP (ref 12–78)
ANION GAP SERPL CALC-SCNC: 9 MMOL/L — SIGNIFICANT CHANGE UP (ref 5–17)
AST SERPL-CCNC: 53 U/L — HIGH (ref 15–37)
BASOPHILS # BLD AUTO: 0.03 K/UL — SIGNIFICANT CHANGE UP (ref 0–0.2)
BASOPHILS NFR BLD AUTO: 0.5 % — SIGNIFICANT CHANGE UP (ref 0–2)
BILIRUB SERPL-MCNC: 0.1 MG/DL — LOW (ref 0.2–1.2)
BUN SERPL-MCNC: 21 MG/DL — SIGNIFICANT CHANGE UP (ref 7–23)
CALCIUM SERPL-MCNC: 8.4 MG/DL — LOW (ref 8.5–10.1)
CHLORIDE SERPL-SCNC: 106 MMOL/L — SIGNIFICANT CHANGE UP (ref 96–108)
CK MB CFR SERPL CALC: 2.3 NG/ML — SIGNIFICANT CHANGE UP (ref 0.5–3.6)
CO2 SERPL-SCNC: 25 MMOL/L — SIGNIFICANT CHANGE UP (ref 22–31)
CREAT SERPL-MCNC: 1.22 MG/DL — SIGNIFICANT CHANGE UP (ref 0.5–1.3)
EOSINOPHIL # BLD AUTO: 0.37 K/UL — SIGNIFICANT CHANGE UP (ref 0–0.5)
EOSINOPHIL NFR BLD AUTO: 6.1 % — HIGH (ref 0–6)
GLUCOSE SERPL-MCNC: 104 MG/DL — HIGH (ref 70–99)
HCT VFR BLD CALC: 40.1 % — SIGNIFICANT CHANGE UP (ref 39–50)
HGB BLD-MCNC: 13.1 G/DL — SIGNIFICANT CHANGE UP (ref 13–17)
IMM GRANULOCYTES NFR BLD AUTO: 0.5 % — SIGNIFICANT CHANGE UP (ref 0–1.5)
LYMPHOCYTES # BLD AUTO: 1.33 K/UL — SIGNIFICANT CHANGE UP (ref 1–3.3)
LYMPHOCYTES # BLD AUTO: 21.9 % — SIGNIFICANT CHANGE UP (ref 13–44)
MCHC RBC-ENTMCNC: 30.3 PG — SIGNIFICANT CHANGE UP (ref 27–34)
MCHC RBC-ENTMCNC: 32.7 GM/DL — SIGNIFICANT CHANGE UP (ref 32–36)
MCV RBC AUTO: 92.6 FL — SIGNIFICANT CHANGE UP (ref 80–100)
MONOCYTES # BLD AUTO: 0.8 K/UL — SIGNIFICANT CHANGE UP (ref 0–0.9)
MONOCYTES NFR BLD AUTO: 13.2 % — SIGNIFICANT CHANGE UP (ref 2–14)
NEUTROPHILS # BLD AUTO: 3.51 K/UL — SIGNIFICANT CHANGE UP (ref 1.8–7.4)
NEUTROPHILS NFR BLD AUTO: 57.8 % — SIGNIFICANT CHANGE UP (ref 43–77)
PLATELET # BLD AUTO: 285 K/UL — SIGNIFICANT CHANGE UP (ref 150–400)
POTASSIUM SERPL-MCNC: 4.4 MMOL/L — SIGNIFICANT CHANGE UP (ref 3.5–5.3)
POTASSIUM SERPL-SCNC: 4.4 MMOL/L — SIGNIFICANT CHANGE UP (ref 3.5–5.3)
PROT SERPL-MCNC: 6.9 GM/DL — SIGNIFICANT CHANGE UP (ref 6–8.3)
RBC # BLD: 4.33 M/UL — SIGNIFICANT CHANGE UP (ref 4.2–5.8)
RBC # FLD: 14.7 % — HIGH (ref 10.3–14.5)
SODIUM SERPL-SCNC: 140 MMOL/L — SIGNIFICANT CHANGE UP (ref 135–145)
TROPONIN I SERPL-MCNC: <.015 NG/ML — SIGNIFICANT CHANGE UP (ref 0.01–0.04)
WBC # BLD: 6.07 K/UL — SIGNIFICANT CHANGE UP (ref 3.8–10.5)
WBC # FLD AUTO: 6.07 K/UL — SIGNIFICANT CHANGE UP (ref 3.8–10.5)

## 2018-09-03 PROCEDURE — 99285 EMERGENCY DEPT VISIT HI MDM: CPT

## 2018-09-03 PROCEDURE — 71045 X-RAY EXAM CHEST 1 VIEW: CPT | Mod: 26

## 2018-09-03 RX ORDER — IPRATROPIUM/ALBUTEROL SULFATE 18-103MCG
3 AEROSOL WITH ADAPTER (GRAM) INHALATION ONCE
Qty: 0 | Refills: 0 | Status: COMPLETED | OUTPATIENT
Start: 2018-09-03 | End: 2018-09-03

## 2018-09-03 RX ADMIN — Medication 3 MILLILITER(S): at 22:32

## 2018-09-03 RX ADMIN — Medication 50 MILLIGRAM(S): at 22:32

## 2018-09-03 NOTE — ED ADULT NURSE NOTE - NSIMPLEMENTINTERV_GEN_ALL_ED
Implemented All Universal Safety Interventions:  Surveyor to call system. Call bell, personal items and telephone within reach. Instruct patient to call for assistance. Room bathroom lighting operational. Non-slip footwear when patient is off stretcher. Physically safe environment: no spills, clutter or unnecessary equipment. Stretcher in lowest position, wheels locked, appropriate side rails in place.

## 2018-09-03 NOTE — ED ADULT TRIAGE NOTE - CHIEF COMPLAINT QUOTE
pt c/o generalized chest pain x 2 hours with SOB.  increase pain on cough which he has had on/off x 1 month   PMH COPD

## 2018-09-04 DIAGNOSIS — I10 ESSENTIAL (PRIMARY) HYPERTENSION: ICD-10-CM

## 2018-09-04 DIAGNOSIS — J44.1 CHRONIC OBSTRUCTIVE PULMONARY DISEASE WITH (ACUTE) EXACERBATION: ICD-10-CM

## 2018-09-04 PROBLEM — J44.9 CHRONIC OBSTRUCTIVE PULMONARY DISEASE, UNSPECIFIED: Chronic | Status: ACTIVE | Noted: 2018-07-29

## 2018-09-04 LAB
ANION GAP SERPL CALC-SCNC: 10 MMOL/L — SIGNIFICANT CHANGE UP (ref 5–17)
APTT BLD: 34.6 SEC — SIGNIFICANT CHANGE UP (ref 27.5–37.4)
BASE EXCESS BLDA CALC-SCNC: 0.4 MMOL/L — SIGNIFICANT CHANGE UP (ref -2–2)
BASOPHILS # BLD AUTO: 0.01 K/UL — SIGNIFICANT CHANGE UP (ref 0–0.2)
BASOPHILS NFR BLD AUTO: 0.1 % — SIGNIFICANT CHANGE UP (ref 0–2)
BLOOD GAS COMMENTS: SIGNIFICANT CHANGE UP
BLOOD GAS SOURCE: SIGNIFICANT CHANGE UP
BUN SERPL-MCNC: 21 MG/DL — SIGNIFICANT CHANGE UP (ref 7–23)
CALCIUM SERPL-MCNC: 8.7 MG/DL — SIGNIFICANT CHANGE UP (ref 8.5–10.1)
CHLORIDE SERPL-SCNC: 102 MMOL/L — SIGNIFICANT CHANGE UP (ref 96–108)
CO2 SERPL-SCNC: 25 MMOL/L — SIGNIFICANT CHANGE UP (ref 22–31)
CREAT SERPL-MCNC: 1.09 MG/DL — SIGNIFICANT CHANGE UP (ref 0.5–1.3)
EOSINOPHIL # BLD AUTO: 0.03 K/UL — SIGNIFICANT CHANGE UP (ref 0–0.5)
EOSINOPHIL NFR BLD AUTO: 0.4 % — SIGNIFICANT CHANGE UP (ref 0–6)
FLUAV SPEC QL CULT: NEGATIVE — SIGNIFICANT CHANGE UP
FLUBV AG SPEC QL IA: NEGATIVE — SIGNIFICANT CHANGE UP
GLUCOSE SERPL-MCNC: 156 MG/DL — HIGH (ref 70–99)
HCO3 BLDA-SCNC: 25 MMOL/L — SIGNIFICANT CHANGE UP (ref 21–29)
HCT VFR BLD CALC: 38.7 % — LOW (ref 39–50)
HGB BLD-MCNC: 12.8 G/DL — LOW (ref 13–17)
HOROWITZ INDEX BLDA+IHG-RTO: 32 — SIGNIFICANT CHANGE UP
IMM GRANULOCYTES NFR BLD AUTO: 0.4 % — SIGNIFICANT CHANGE UP (ref 0–1.5)
INR BLD: 0.97 RATIO — SIGNIFICANT CHANGE UP (ref 0.88–1.16)
LYMPHOCYTES # BLD AUTO: 0.61 K/UL — LOW (ref 1–3.3)
LYMPHOCYTES # BLD AUTO: 7.5 % — LOW (ref 13–44)
MCHC RBC-ENTMCNC: 30.5 PG — SIGNIFICANT CHANGE UP (ref 27–34)
MCHC RBC-ENTMCNC: 33.1 GM/DL — SIGNIFICANT CHANGE UP (ref 32–36)
MCV RBC AUTO: 92.4 FL — SIGNIFICANT CHANGE UP (ref 80–100)
MONOCYTES # BLD AUTO: 0.09 K/UL — SIGNIFICANT CHANGE UP (ref 0–0.9)
MONOCYTES NFR BLD AUTO: 1.1 % — LOW (ref 2–14)
NEUTROPHILS # BLD AUTO: 7.34 K/UL — SIGNIFICANT CHANGE UP (ref 1.8–7.4)
NEUTROPHILS NFR BLD AUTO: 90.5 % — HIGH (ref 43–77)
NRBC # BLD: 0 /100 WBCS — SIGNIFICANT CHANGE UP (ref 0–0)
PCO2 BLDA: 41 MMHG — SIGNIFICANT CHANGE UP (ref 32–46)
PH BLD: 7.4 — SIGNIFICANT CHANGE UP (ref 7.35–7.45)
PLATELET # BLD AUTO: 255 K/UL — SIGNIFICANT CHANGE UP (ref 150–400)
PO2 BLDA: 74 MMHG — SIGNIFICANT CHANGE UP (ref 74–108)
POTASSIUM SERPL-MCNC: 4.6 MMOL/L — SIGNIFICANT CHANGE UP (ref 3.5–5.3)
POTASSIUM SERPL-SCNC: 4.6 MMOL/L — SIGNIFICANT CHANGE UP (ref 3.5–5.3)
PROTHROM AB SERPL-ACNC: 10.6 SEC — SIGNIFICANT CHANGE UP (ref 9.8–12.7)
RBC # BLD: 4.19 M/UL — LOW (ref 4.2–5.8)
RBC # FLD: 14.5 % — SIGNIFICANT CHANGE UP (ref 10.3–14.5)
SAO2 % BLDA: 95 % — SIGNIFICANT CHANGE UP (ref 92–96)
SODIUM SERPL-SCNC: 137 MMOL/L — SIGNIFICANT CHANGE UP (ref 135–145)
TROPONIN I SERPL-MCNC: <.015 NG/ML — SIGNIFICANT CHANGE UP (ref 0.01–0.04)
WBC # BLD: 8.11 K/UL — SIGNIFICANT CHANGE UP (ref 3.8–10.5)
WBC # FLD AUTO: 8.11 K/UL — SIGNIFICANT CHANGE UP (ref 3.8–10.5)

## 2018-09-04 PROCEDURE — 12345: CPT | Mod: NC

## 2018-09-04 PROCEDURE — 99223 1ST HOSP IP/OBS HIGH 75: CPT

## 2018-09-04 PROCEDURE — 71275 CT ANGIOGRAPHY CHEST: CPT | Mod: 26

## 2018-09-04 PROCEDURE — 93010 ELECTROCARDIOGRAM REPORT: CPT

## 2018-09-04 RX ORDER — ASPIRIN/CALCIUM CARB/MAGNESIUM 324 MG
325 TABLET ORAL ONCE
Qty: 0 | Refills: 0 | Status: COMPLETED | OUTPATIENT
Start: 2018-09-04 | End: 2018-09-04

## 2018-09-04 RX ORDER — AZITHROMYCIN 500 MG/1
TABLET, FILM COATED ORAL
Qty: 0 | Refills: 0 | Status: DISCONTINUED | OUTPATIENT
Start: 2018-09-04 | End: 2018-09-05

## 2018-09-04 RX ORDER — AZITHROMYCIN 500 MG/1
500 TABLET, FILM COATED ORAL EVERY 24 HOURS
Qty: 0 | Refills: 0 | Status: DISCONTINUED | OUTPATIENT
Start: 2018-09-05 | End: 2018-09-05

## 2018-09-04 RX ORDER — HEPARIN SODIUM 5000 [USP'U]/ML
5000 INJECTION INTRAVENOUS; SUBCUTANEOUS EVERY 12 HOURS
Qty: 0 | Refills: 0 | Status: DISCONTINUED | OUTPATIENT
Start: 2018-09-04 | End: 2018-09-05

## 2018-09-04 RX ORDER — AMLODIPINE BESYLATE 2.5 MG/1
2.5 TABLET ORAL DAILY
Qty: 0 | Refills: 0 | Status: DISCONTINUED | OUTPATIENT
Start: 2018-09-04 | End: 2018-09-05

## 2018-09-04 RX ORDER — INFLUENZA VIRUS VACCINE 15; 15; 15; 15 UG/.5ML; UG/.5ML; UG/.5ML; UG/.5ML
0.5 SUSPENSION INTRAMUSCULAR ONCE
Qty: 0 | Refills: 0 | Status: DISCONTINUED | OUTPATIENT
Start: 2018-09-04 | End: 2018-09-05

## 2018-09-04 RX ORDER — PANTOPRAZOLE SODIUM 20 MG/1
40 TABLET, DELAYED RELEASE ORAL
Qty: 0 | Refills: 0 | Status: DISCONTINUED | OUTPATIENT
Start: 2018-09-04 | End: 2018-09-05

## 2018-09-04 RX ORDER — TRAMADOL HYDROCHLORIDE 50 MG/1
50 TABLET ORAL
Qty: 0 | Refills: 0 | COMMUNITY

## 2018-09-04 RX ORDER — IPRATROPIUM/ALBUTEROL SULFATE 18-103MCG
3 AEROSOL WITH ADAPTER (GRAM) INHALATION EVERY 6 HOURS
Qty: 0 | Refills: 0 | Status: DISCONTINUED | OUTPATIENT
Start: 2018-09-04 | End: 2018-09-05

## 2018-09-04 RX ORDER — AMLODIPINE BESYLATE 2.5 MG/1
1 TABLET ORAL
Qty: 0 | Refills: 0 | COMMUNITY

## 2018-09-04 RX ORDER — AZITHROMYCIN 500 MG/1
500 TABLET, FILM COATED ORAL ONCE
Qty: 0 | Refills: 0 | Status: COMPLETED | OUTPATIENT
Start: 2018-09-04 | End: 2018-09-04

## 2018-09-04 RX ADMIN — Medication 40 MILLIGRAM(S): at 13:56

## 2018-09-04 RX ADMIN — Medication 40 MILLIGRAM(S): at 21:51

## 2018-09-04 RX ADMIN — HEPARIN SODIUM 5000 UNIT(S): 5000 INJECTION INTRAVENOUS; SUBCUTANEOUS at 18:02

## 2018-09-04 RX ADMIN — Medication 325 MILLIGRAM(S): at 03:35

## 2018-09-04 RX ADMIN — AZITHROMYCIN 255 MILLIGRAM(S): 500 TABLET, FILM COATED ORAL at 04:43

## 2018-09-04 RX ADMIN — Medication 3 MILLILITER(S): at 23:39

## 2018-09-04 RX ADMIN — PANTOPRAZOLE SODIUM 40 MILLIGRAM(S): 20 TABLET, DELAYED RELEASE ORAL at 07:54

## 2018-09-04 RX ADMIN — Medication 3 MILLILITER(S): at 17:21

## 2018-09-04 RX ADMIN — Medication 3 MILLILITER(S): at 05:22

## 2018-09-04 RX ADMIN — Medication 3 MILLILITER(S): at 12:11

## 2018-09-04 RX ADMIN — Medication 40 MILLIGRAM(S): at 05:22

## 2018-09-04 RX ADMIN — AMLODIPINE BESYLATE 2.5 MILLIGRAM(S): 2.5 TABLET ORAL at 05:23

## 2018-09-04 RX ADMIN — HEPARIN SODIUM 5000 UNIT(S): 5000 INJECTION INTRAVENOUS; SUBCUTANEOUS at 05:23

## 2018-09-04 NOTE — H&P ADULT - HISTORY OF PRESENT ILLNESS
69 y/o male hx copd, recent admission for copd exacerbation last month c/o somewhat sudden onset chest tightness with difficulty breathing around 8-9pm on night of arrival. States felt air hungry and some cp/tightness worse with exertion/deep breaths with some weakness. No hx dvt/pe, no leg pain/swelling, no f/c or uri symptoms. Taking nebulizer at home. Pt quit smoking with last admission, denies smoking since; nebulizers help but still symptomatic. Has had increasing productive cough, wheezing, no hemoptysis.

## 2018-09-04 NOTE — ED PROVIDER NOTE - OBJECTIVE STATEMENT
69 y/o male hx copd, recent admission for copd exacerbation last month c/o somewhat sudden onset chest pain/tightness with difficulty breathing around 8-9pm on night of arrival. States felt air hungry and some cp worse with exertion with some weakness. No hx dvt/pe, no leg pain/swelling, no f/c or uri symptoms. Taking nebulizer at home. Pt quit smoking with last admission. no smoking since    ROS: No fever/chills. No eye pain/changes in vision, No ear pain/sore throat/dysphagia,  No abdominal pain, N/V/D, no black/bloody bm. No dysuria/frequency/discharge, No headache. No Dizziness.    No rashes or breaks in skin. No numbness/tingling/weakness.

## 2018-09-04 NOTE — H&P ADULT - NSHPREVIEWOFSYSTEMS_GEN_ALL_CORE
ROS: No fever/chills. No eye pain/changes in vision, No ear pain/sore throat/dysphagia,  No abdominal pain, N/V/D, no black/bloody bm. No dysuria/frequency/discharge, No headache. No Dizziness.    No rashes or breaks in skin. No numbness/tingling/weakness.

## 2018-09-04 NOTE — H&P ADULT - NSHPLABSRESULTS_GEN_ALL_CORE
LABS:                        13.1   6.07  )-----------( 285      ( 03 Sep 2018 22:46 )             40.1     09-03    140  |  106  |  21  ----------------------------<  104<H>  4.4   |  25  |  1.22    Ca    8.4<L>      03 Sep 2018 22:46    TPro  6.9  /  Alb  3.2<L>  /  TBili  0.1<L>  /  DBili  x   /  AST  53<H>  /  ALT  55  /  AlkPhos  74  09-03    PT/INR - ( 04 Sep 2018 00:29 )   PT: 10.6 sec;   INR: 0.97 ratio         PTT - ( 04 Sep 2018 00:29 )  PTT:34.6 sec    CAPILLARY BLOOD GLUCOSE  Blood Gas Profile - Arterial (09.04.18 @ 03:37)    Blood Gas Source: Arterial    Blood Gas Comments: hematoma noted.    pH, Blood: 7.40    pCO2, Arterial: 41 mmHg    pO2, Arterial: 74 mmHg    HCO3, Arterial: 25 mmol/L    Base Excess, Arterial: 0.4 mmol/L    Oxygen Saturation, Arterial: 95 %    FIO2, Arterial: 32.0              RADIOLOGY & ADDITIONAL TESTS:  < from: CT Angio Chest w/ IV Cont (09.04.18 @ 00:23) >    IMPRESSION:    1.  No pulmonary embolism.  2.  No focal infiltrate, pleural effusion or pneumothorax.  3.  Severe diffuse centrilobular emphysema.  4.  Left upper lobe calcified granulomas.  5.  Multiple prominent nonspecific mediastinal and bilateral hilar lymph   nodes measuring up to 1 cm short axis.    < end of copied text >      Imaging Personally Reviewed:  [x ] YES  [ ] NO  EKG: sinus@69, LAHB RBBB

## 2018-09-04 NOTE — H&P ADULT - NSHPPHYSICALEXAM_GEN_ALL_CORE
T(C): 36.6 (04 Sep 2018 03:16), Max: 36.6 (04 Sep 2018 03:16)  T(F): 97.8 (04 Sep 2018 03:16), Max: 97.8 (04 Sep 2018 03:16)  HR: 59 (04 Sep 2018 03:16) (59 - 82)  BP: 141/78 (04 Sep 2018 03:16) (141/78 - 143/84)  BP(mean): --  RR: 17 (04 Sep 2018 03:16) (17 - 18)  SpO2: 100% (04 Sep 2018 03:16) (96% - 100%)    PHYSICAL EXAM:  GENERAL: NAD, well-groomed, well-developed  HEAD:  Atraumatic, Normocephalic  EYES: EOMI, PERRLA, conjunctiva and sclera clear  ENMT: No tonsillar erythema, exudates, or enlargement; Moist mucous membranes, No lesions  NECK: Supple, No JVD, Normal thyroid  NERVOUS SYSTEM:  Alert & Oriented X3, Good concentration; Motor Strength 5/5 B/L upper and lower extremities; no sensory deficits  CHEST/LUNG: bilateral wheezing  HEART: Regular rate and rhythm; No murmurs, rubs, or gallops  ABDOMEN: Soft, Nontender, Nondistended; Bowel sounds present  EXTREMITIES:  + Peripheral Pulses, No clubbing, cyanosis, or edema  LYMPH: No lymphadenopathy noted  SKIN: No rashes or lesions

## 2018-09-04 NOTE — H&P ADULT - ASSESSMENT
69 y/o male hx copd, recent admission for copd exacerbation last month c/o somewhat sudden onset chest tightness with difficulty breathing, again presents with exacerbation COPD.  IMPROVE VTE Individual Risk Assessment          RISK                                                          Points    [  ] Previous VTE                                                3    [  ] Thrombophilia                                             2    [  ] Lower limb paralysis                                    2        (unable to hold up >15 seconds)      [  ] Current Cancer                                             2         (within 6 months)    [  ] Immobilization > 24 hrs                              1    [  ] ICU/CCU stay > 24 hours                            1    [ x ] Age > 60                                                    1    IMPROVE VTE Score _____1____

## 2018-09-04 NOTE — CONSULT NOTE ADULT - SUBJECTIVE AND OBJECTIVE BOX
Patient is a 68y old  Male who presents with a chief complaint of SOB and cough. (04 Sep 2018 03:53)    HPI:  69 y/o male with HTN,  COPD, Tobacco abuse for 42 years(quit 2 months ago), Marijuana abuse.  Recent admission for copd exacerbation about a month ago. Came with somewhat sudden onset chest tightness with difficulty breathing around 8-9pm on night of arrival. States felt air hungry and some cp/tightness worse with exertion/deep breaths with some weakness.  Thinks hot and humid weather may have contributed to it.  Denies fever or chills but reports cough with sputum    PAST MEDICAL & SURGICAL HISTORY:  Hypertension, unspecified type  Chronic obstructive pulmonary disease, unspecified COPD type  No significant past surgical history    FAMILY HISTORY:  No pertinent family history in first degree relatives    SOCIAL HISTORY: BMI (kg/m2): 25.1 . Smoked 42 years, quit 1 month ago.    Allergies  No Known Allergies    MEDICATIONS  (STANDING):  ALBUTerol/ipratropium for Nebulization 3 milliLiter(s) Nebulizer every 6 hours  amLODIPine   Tablet 2.5 milliGRAM(s) Oral daily  azithromycin  IVPB      heparin  Injectable 5000 Unit(s) SubCutaneous every 12 hours  influenza   Vaccine 0.5 milliLiter(s) IntraMuscular once  methylPREDNISolone sodium succinate Injectable 40 milliGRAM(s) IV Push every 8 hours  pantoprazole    Tablet 40 milliGRAM(s) Oral before breakfast    MEDICATIONS  (PRN):  guaiFENesin / dextromethorphan  Syrup 10 milliLiter(s) Oral every 6 hours PRN Cough    REVIEW OF SYSTEMS:    Constitutional:            No fever, weight loss or fatigue  HEENT:                      No difficulty hearing, tinnitus, vertigo; No sinus or throat pain  Respiratory:                sob, cough  Cardiovascular:           No chest pain, palpitations  Gastrointestinal:        No abdominal or epigastric pain. No N/V/diarrhea or hematemesis  Genitourinary:            No dysuria, frequency, hematuria or incontinence  SKIN:                             no rash  Musculoskeletal:        No joint pain or swelling  Extremities:                No swelling  Neurological:              No headaches  Psychiatric:                 No depression, anxiety    MACRA & MIPS:  Vaccines - Influenza: no and Pneumovax: no  BMI:  normal.  Tobacco:  quit 1 month ago  Blood Pressure Screening / Control of: 143/84  Current Medications Reviewed:  yes    Vital Signs Last 24 Hrs  T(C): 36.4 (04 Sep 2018 07:41), Max: 36.6 (04 Sep 2018 03:16)  T(F): 97.5 (04 Sep 2018 07:41), Max: 97.8 (04 Sep 2018 03:16)  HR: 71 (04 Sep 2018 12:18) (59 - 82)  BP: 122/67 (04 Sep 2018 07:41) (120/63 - 143/84)  BP(mean): --  RR: 16 (04 Sep 2018 07:41) (16 - 18)  SpO2: 98% (04 Sep 2018 12:18) (96% - 100%)    PHYSICAL EXAM:  GEN:         Awake, responsive and comfortable.  HEENT:    Normal.    RESP:      decreased air entry.  CVS:         Regular rate and rhythm.   ABD:         Soft, non-tender, non-distended;   :             No costovertebral angle tenderness  SKIN:           Warm and dry.  EXTR:            No clubbing, cyanosis or edema  CNS:              Intact sensory and motor function.  PSYCH:        cooperative, no anxiety or depression    LABS:                        12.8   8.11  )-----------( 255      ( 04 Sep 2018 05:22 )             38.7     09-04    137  |  102  |  21  ----------------------------<  156<H>  4.6   |  25  |  1.09    Ca    8.7      04 Sep 2018 05:22    TPro  6.9  /  Alb  3.2<L>  /  TBili  0.1<L>  /  DBili  x   /  AST  53<H>  /  ALT  55  /  AlkPhos  74  09-03    PT/INR - ( 04 Sep 2018 00:29 )   PT: 10.6 sec;   INR: 0.97 ratio      PTT - ( 04 Sep 2018 00:29 )  PTT:34.6 sec  09-04 @ 03:37  pH: 7.40  pCO2: 41  pO2: 74  SaO2: 95    EKG: sinus rhythm    RADIOLOGY & ADDITIONAL STUDIES:  < from: CT Angio Chest w/ IV Cont (09.04.18 @ 00:23) >    EXAM:  CT ANGIO CHEST (W)AW IC                          PROCEDURE DATE:  09/04/2018      INTERPRETATION:  CLINICAL INFORMATION: Chest pain and shortness of   breath.  Recent travel.  Rule out pulmonary embolism.    TECHNIQUE:   CT pulmonary angiogram.    Axial CT images were acquired through the chest during the pulmonary   arterial phase.   Intravenous contrast:  75 cc of Omnipaque-350 mg/ml were administered,   and 25 cc were discarded.  Two-dimensional and MIP reformats were generated.    COMPARISON STUDY:None.    FINDINGS:     Lungs: Severe diffuse centrilobular emphysema.  Several left upper lobe   calcifications are compatible with granulomas.  No noncalcified pulmonary   nodules.  Pleura: Pneumothorax.  Airways: Central airways are clear.  The bronchial lesion.    Heart: Heart size is unremarkable.  No pericardial effusion.  Scattered   atherosclerotic calcifications of the coronary arteries.  Mediastinum/Abena: There are prominent, nonspecific mediastinal and   bilateral hilar lymph nodes measuring up to 1 cm short access in the left   lower paratracheal region and AP window, 8 mm short axis in the   subcarinal region and 8 mm short access in right hilum and 8 mm short   axis in the left hilum.  Vasculature: No pulmonary embolism.  No thoracic aortic aneurysm or gross   dissection.  Mild atherosclerotic calcification of the thoracic aorta.    Bones/chest wall: Mild degenerative changes in the spine.  Visualized lower neck: Within normal limits.  Visualized upperabdomen: Within normal limits.    IMPRESSION:    1.  No pulmonary embolism.  2.  No focal infiltrate, pleural effusion or pneumothorax.  3.  Severe diffuse centrilobular emphysema.  4.  Left upper lobe calcified granulomas.  5.  Multiple prominent nonspecific mediastinal and bilateral hilar lymph   nodes measuring up to 1 cm short axis.    MERVIN NASCIMENTO M.D.,ATTENDING RADIOLOGIST  This document has been electronically signed. Sep  4 2018 12:57AM      ASSESSMENT AND PLAN:  ·	SOB.  ·	Acute COPD exacerbation.  ·	PE ruled out.  ·	Tobacco abuse, quit 1 month ago.  ·	Marijuana abuse.  ·	HTN    Continue nebulizer, steroid and antibiotics.  quit smoking about a month ago.  PFT out pt.

## 2018-09-04 NOTE — ED PROVIDER NOTE - PHYSICAL EXAMINATION
Gen: awake, alert, not significantly resp distress   HEENT: Mucous membranes moist, pink conjunctivae, EOMI  CV: RRR, nl s1/s2.  Resp: end expiratory wheeze b/l.   GI: Abdomen soft, NT, ND. No rebound, no guarding  : No CVAT  Neuro: A&O x 3, moving all 4 extremities  MSK: No spine or joint tenderness to palpation  Skin: No rashes. intact and perfused.

## 2018-09-04 NOTE — ED PROVIDER NOTE - MEDICAL DECISION MAKING DETAILS
states symptoms somewhat different from normal copd exacerbation despite wheezing. trop x2 neg, other labs grossly wnl but pt with mild constant cp and more with exertion. needs cardiology work up. admitted to Dr. Castano

## 2018-09-04 NOTE — PATIENT PROFILE ADULT. - PRO MENTAL HEALTH SX RECENT
difficulty concentrating/Pt writes a lot.... PT has been having difficulty concentrating while writing

## 2018-09-04 NOTE — CHART NOTE - NSCHARTNOTEFT_GEN_A_CORE
Pt admitted for sob/ copd exacerbation.  Will get pulm robyn/ olimpia Guadalupe, maryan sterodis  will follow along  Pt currently denies any complaints, feeling well, saturating well on RA.  CTA no PE. Pt admitted for sob/ copd exacerbation.  Will get pulm robyn/ olimpia Guadalupe, iv steroids  will follow along  Pt currently denies any complaints, feeling well, saturating well on RA.  CTA no PE.  trop x 2negative.

## 2018-09-05 VITALS — OXYGEN SATURATION: 95 %

## 2018-09-05 PROCEDURE — 99239 HOSP IP/OBS DSCHRG MGMT >30: CPT

## 2018-09-05 RX ORDER — IPRATROPIUM/ALBUTEROL SULFATE 18-103MCG
3 AEROSOL WITH ADAPTER (GRAM) INHALATION
Qty: 30 | Refills: 0 | OUTPATIENT
Start: 2018-09-05 | End: 2018-10-04

## 2018-09-05 RX ORDER — AZITHROMYCIN 500 MG/1
1 TABLET, FILM COATED ORAL
Qty: 4 | Refills: 0 | OUTPATIENT
Start: 2018-09-05 | End: 2018-09-08

## 2018-09-05 RX ORDER — PANTOPRAZOLE SODIUM 20 MG/1
1 TABLET, DELAYED RELEASE ORAL
Qty: 14 | Refills: 0 | OUTPATIENT
Start: 2018-09-05 | End: 2018-09-14

## 2018-09-05 RX ORDER — GUAIFENESIN/DEXTROMETHORPHAN 600MG-30MG
10 TABLET, EXTENDED RELEASE 12 HR ORAL
Qty: 150 | Refills: 0 | OUTPATIENT
Start: 2018-09-05 | End: 2018-09-09

## 2018-09-05 RX ORDER — ALBUTEROL 90 UG/1
2 AEROSOL, METERED ORAL
Qty: 1 | Refills: 0 | OUTPATIENT
Start: 2018-09-05

## 2018-09-05 RX ORDER — BUDESONIDE AND FORMOTEROL FUMARATE DIHYDRATE 160; 4.5 UG/1; UG/1
2 AEROSOL RESPIRATORY (INHALATION)
Qty: 1 | Refills: 0 | OUTPATIENT
Start: 2018-09-05

## 2018-09-05 RX ADMIN — Medication 40 MILLIGRAM(S): at 06:10

## 2018-09-05 RX ADMIN — Medication 3 MILLILITER(S): at 05:39

## 2018-09-05 RX ADMIN — Medication 3 MILLILITER(S): at 11:00

## 2018-09-05 RX ADMIN — AMLODIPINE BESYLATE 2.5 MILLIGRAM(S): 2.5 TABLET ORAL at 06:10

## 2018-09-05 RX ADMIN — HEPARIN SODIUM 5000 UNIT(S): 5000 INJECTION INTRAVENOUS; SUBCUTANEOUS at 06:10

## 2018-09-05 RX ADMIN — AZITHROMYCIN 255 MILLIGRAM(S): 500 TABLET, FILM COATED ORAL at 06:11

## 2018-09-05 RX ADMIN — PANTOPRAZOLE SODIUM 40 MILLIGRAM(S): 20 TABLET, DELAYED RELEASE ORAL at 06:10

## 2018-09-05 RX ADMIN — Medication 3 MILLILITER(S): at 17:11

## 2018-09-05 RX ADMIN — Medication 40 MILLIGRAM(S): at 14:10

## 2018-09-05 NOTE — DISCHARGE NOTE ADULT - MEDICATION SUMMARY - MEDICATIONS TO STOP TAKING
I will STOP taking the medications listed below when I get home from the hospital:    traMADol 50 mg oral tablet  -- 50 milligram(s) by mouth 3 times a day, As Needed    predniSONE 10 mg oral tablet  -- 5 tab(s) oral - by mouth once a day x 1 days  4 tab(s) oral - by mouth once a day x 1 days  3 tab(s) oral - by mouth once a day x 1 days  2 tab(s) oral - by mouth once a day x 1 days  1 tab(s) oral - by mouth once a day x 1 days  -- It is very important that you take or use this exactly as directed.  Do not skip doses or discontinue unless directed by your doctor.  Obtain medical advice before taking any non-prescription drugs as some may affect the action of this medication.  Take with food or milk.

## 2018-09-05 NOTE — PROGRESS NOTE ADULT - SUBJECTIVE AND OBJECTIVE BOX
INTERVAL HPI:  69 y/o male with HTN,  COPD, Tobacco abuse for 42 years(quit 2 months ago), Marijuana abuse.  Recent admission for copd exacerbation about a month ago. Came with somewhat sudden onset chest tightness with difficulty breathing around 8-9pm on night of arrival. States felt air hungry and some cp/tightness worse with exertion/deep breaths with some weakness.  Thinks hot and humid weather may have contributed to it.  Denies fever or chills but reports cough with sputum    OVERNIGHT EVENTS:  Awake, comfortable and feels better.    Vital Signs Last 24 Hrs  T(C): 36.7 (05 Sep 2018 12:19), Max: 36.7 (05 Sep 2018 05:45)  T(F): 98 (05 Sep 2018 12:19), Max: 98.1 (05 Sep 2018 05:45)  HR: 80 (05 Sep 2018 12:19) (65 - 88)  BP: 146/60 (05 Sep 2018 12:19) (125/47 - 146/60)  BP(mean): --  RR: 18 (05 Sep 2018 12:19) (16 - 18)  SpO2: 95% (05 Sep 2018 12:19) (95% - 100%)    PHYSICAL EXAM:  GEN:         Awake, responsive and comfortable.  HEENT:    Normal.    RESP:       no wheezing.  CVS:          Regular rate and rhythm.   ABD:         Soft, non-tender, non-distended;     MEDICATIONS  (STANDING):  ALBUTerol/ipratropium for Nebulization 3 milliLiter(s) Nebulizer every 6 hours  amLODIPine   Tablet 2.5 milliGRAM(s) Oral daily  azithromycin  IVPB 500 milliGRAM(s) IV Intermittent every 24 hours  azithromycin  IVPB      heparin  Injectable 5000 Unit(s) SubCutaneous every 12 hours  influenza   Vaccine 0.5 milliLiter(s) IntraMuscular once  methylPREDNISolone sodium succinate Injectable 40 milliGRAM(s) IV Push every 8 hours  pantoprazole    Tablet 40 milliGRAM(s) Oral before breakfast    MEDICATIONS  (PRN):  guaiFENesin/dextromethorphan  Syrup 10 milliLiter(s) Oral every 6 hours PRN Cough    LABS:                        12.8   8.11  )-----------( 255      ( 04 Sep 2018 05:22 )             38.7     09-04    137  |  102  |  21  ----------------------------<  156<H>  4.6   |  25  |  1.09    Ca    8.7      04 Sep 2018 05:22    TPro  6.9  /  Alb  3.2<L>  /  TBili  0.1<L>  /  DBili  x   /  AST  53<H>  /  ALT  55  /  AlkPhos  74  09-03    PT/INR - ( 04 Sep 2018 00:29 )   PT: 10.6 sec;   INR: 0.97 ratio      PTT - ( 04 Sep 2018 00:29 )  PTT:34.6 sec  09-04 @ 03:37  pH: 7.40  pCO2: 41  pO2: 74  SaO2: 95  ASSESSMENT AND PLAN:  ·	SOB.  ·	Acute COPD exacerbation.  ·	PE ruled out.  ·	Tobacco abuse, quit 1 month ago.  ·	Marijuana abuse.  ·	HTN    Clinically improved. Taper steroids over 5-7 days.  Can discharge on Symbicort every  12 hours with PRN bronchodilators.  Agrees to follow in office.

## 2018-09-05 NOTE — DISCHARGE NOTE ADULT - PATIENT PORTAL LINK FT
You can access the [facility] Patient Portal, offered by [facility], by registering with the following website: [email or web address]

## 2018-09-05 NOTE — DISCHARGE NOTE ADULT - MEDICATION SUMMARY - MEDICATIONS TO TAKE
I will START or STAY ON the medications listed below when I get home from the hospital:    predniSONE 10 mg oral tablet  -- 6 tab daily x3 days then 4 tab daily x3 days then 2 tab daily x 3 days then 1 tab daily x3 days  -- It is very important that you take or use this exactly as directed.  Do not skip doses or discontinue unless directed by your doctor.  Obtain medical advice before taking any non-prescription drugs as some may affect the action of this medication.  Take with food or milk.    -- Indication: For COPD exacerbation    albuterol 90 mcg/inh inhalation aerosol  -- 2 puff(s) inhaled 4 times a day as needed for shortness of breath/ wheezing  -- For inhalation only.  It is very important that you take or use this exactly as directed.  Do not skip doses or discontinue unless directed by your doctor.  Obtain medical advice before taking any non-prescription drugs as some may affect the action of this medication.  Shake well before use.    -- Indication: For COPD    Symbicort 160 mcg-4.5 mcg/inh inhalation aerosol  -- 2 puff(s) inhaled 2 times a day   -- Check with your doctor before becoming pregnant.  For inhalation only.  Rinse mouth thoroughly after use.    -- Indication: For COPD    ipratropium-albuterol 0.5 mg-2.5 mg/3 mLinhalation solution  -- 3 milliliter(s) inhaled every 6 hours as needed for sob/ wheezing, Dx [id number]  -- Indication: For COPD    amLODIPine 2.5 mg oral tablet  -- 1 tab(s) by mouth once a day  -- Indication: For Essential hypertension    azithromycin 250 mg oral tablet  -- 1 tab(s) by mouth once a day x 4 more days  -- Do not take dairy products, antacids, or iron preparations within one hour of this medication.  Finish all this medication unless otherwise directed by prescriber.    -- Indication: For COPD exacerbation    pantoprazole 40 mg oral delayed release tablet  -- 1 tab(s) by mouth once a day (before a meal)  -- Indication: For Gi prophylaxis    guaifenesin-dextromethorphan 100 mg-10 mg/5 mL oral liquid  -- 10 milliliter(s) by mouth every 6 hours, As needed, Cough  -- Indication: For COugh

## 2018-09-05 NOTE — DISCHARGE NOTE ADULT - PLAN OF CARE
avoid further attacks take medicines as directed  taper steroids per instructions  follow with PCP  follow with Pulmonary and get lung test/ PFT as outpatient  stop smoking and marijuana use. continue home meds

## 2018-09-05 NOTE — DISCHARGE NOTE ADULT - CARE PLAN
Principal Discharge DX:	COPD exacerbation  Goal:	avoid further attacks  Assessment and plan of treatment:	take medicines as directed  taper steroids per instructions  follow with PCP  follow with Pulmonary and get lung test/ PFT as outpatient  stop smoking and marijuana use.  Secondary Diagnosis:	Essential hypertension  Assessment and plan of treatment:	continue home meds

## 2018-09-05 NOTE — DISCHARGE NOTE ADULT - PROVIDER TOKENS
TOKEN:'[id number]',FREE:[LAST:[Kelsi],PHONE:[(   )    -],FAX:[(   )    -],ADDRESS:[follow with PCP/ DR. Dolan x 1 week]]

## 2018-09-05 NOTE — DISCHARGE NOTE ADULT - HOSPITAL COURSE
69 y/o male hx copd, recent admission for copd exacerbation last month c/o somewhat sudden onset chest tightness with difficulty breathing around 8-9pm on night of arrival. States felt air hungry and some cp/tightness worse with exertion/deep breaths with some weakness. No hx dvt/pe, no leg pain/swelling, no f/c or uri symptoms. Taking nebulizer at home. Pt quit smoking with last admission, denies smoking since then; nebulizers help but still symptomatic. Has had increasing productive cough, wheezing, but no hemoptysis or fevers, pt admitted for acute copd exacerbation, ruled out PE with negative CTA chest. started on duoneb, iv steroids, IV zithro, seen by pulmonary- now improvement in s/s- sx improved- ambulating, pulse ox normal on RA. Pt counseled and educated to stop marijuana abuse / smoking (stopped smoking x 1 month ago)  Pt to get PFT outpatient per pulmonary, d/w him- verbalized understanding  Pt being dc on tapering dose of steroids, BD prn, symbicort    Trop x2 normal  ABG normal

## 2018-09-11 DIAGNOSIS — F12.10 CANNABIS ABUSE, UNCOMPLICATED: ICD-10-CM

## 2018-09-11 DIAGNOSIS — J44.1 CHRONIC OBSTRUCTIVE PULMONARY DISEASE WITH (ACUTE) EXACERBATION: ICD-10-CM

## 2018-09-11 DIAGNOSIS — I10 ESSENTIAL (PRIMARY) HYPERTENSION: ICD-10-CM

## 2018-09-11 DIAGNOSIS — Z87.891 PERSONAL HISTORY OF NICOTINE DEPENDENCE: ICD-10-CM

## 2018-09-11 DIAGNOSIS — Z79.52 LONG TERM (CURRENT) USE OF SYSTEMIC STEROIDS: ICD-10-CM

## 2019-06-25 NOTE — ED ADULT NURSE NOTE - CADM POA URETHRAL CATHETER
Dionisio called from [facility] And needs orders for Home Health, Nursing care, Speech and Physical therapy.   
Referral placed to home health at [facility], since I did not see him I am not sure if this would be a valid referral but I will submit the request.  
No

## 2020-02-17 ENCOUNTER — TELEPHONE (OUTPATIENT)
Dept: NEUROSURGERY | Facility: CLINIC | Age: 70
End: 2020-02-17

## 2020-02-17 NOTE — TELEPHONE ENCOUNTER
Spoke with pt. Reports Hx of chronic lumbar back pain radiating into his hip, progressively worsening over the past several months. He had been exercising in the gym 6-7 days a week, has since had to stop entirely. He is now having difficulty walking 2/2 pain. He has tried therapy, acupuncture, prescribed pain medications with limited relief. He was told a few years ago that he would require invasive spine surgery to address the issue, but pt was resistant to the idea. Advised that a neurosurgeon would have little to offer patient other than surgery, if it is indicated. Pt v/u. Last MRI lumbar spine completed 1 month ago. Pt has disc to bring to appt. Appt made. Reviewed time/date/location. Pt in agreement with plan. Will send slip in the mail.

## 2020-02-17 NOTE — TELEPHONE ENCOUNTER
Left voicemail requesting call back to discuss medical Hx, recent imaging, expectations of appointment with NSGY clinic in regards to his pain.

## 2020-03-04 ENCOUNTER — OFFICE VISIT (OUTPATIENT)
Dept: NEUROSURGERY | Facility: CLINIC | Age: 70
End: 2020-03-04
Payer: COMMERCIAL

## 2020-03-04 VITALS
DIASTOLIC BLOOD PRESSURE: 71 MMHG | HEART RATE: 88 BPM | TEMPERATURE: 98 F | SYSTOLIC BLOOD PRESSURE: 140 MMHG | WEIGHT: 183 LBS

## 2020-03-04 DIAGNOSIS — Z72.0 TOBACCO ABUSE: ICD-10-CM

## 2020-03-04 DIAGNOSIS — M51.36 DEGENERATIVE DISC DISEASE, LUMBAR: ICD-10-CM

## 2020-03-04 DIAGNOSIS — M47.816 LUMBAR SPONDYLOSIS: ICD-10-CM

## 2020-03-04 DIAGNOSIS — M51.26 LUMBAR DISC HERNIATION: ICD-10-CM

## 2020-03-04 DIAGNOSIS — G89.29 CHRONIC LEFT-SIDED LOW BACK PAIN WITHOUT SCIATICA: ICD-10-CM

## 2020-03-04 DIAGNOSIS — M48.062 LUMBAR STENOSIS WITH NEUROGENIC CLAUDICATION: ICD-10-CM

## 2020-03-04 DIAGNOSIS — M47.816 FACET ARTHROPATHY, LUMBAR: ICD-10-CM

## 2020-03-04 DIAGNOSIS — M54.50 CHRONIC LEFT-SIDED LOW BACK PAIN WITHOUT SCIATICA: ICD-10-CM

## 2020-03-04 PROCEDURE — 99999 PR PBB SHADOW E&M-EST. PATIENT-LVL III: ICD-10-PCS | Mod: PBBFAC,,, | Performed by: PHYSICIAN ASSISTANT

## 2020-03-04 PROCEDURE — 99999 PR PBB SHADOW E&M-EST. PATIENT-LVL III: CPT | Mod: PBBFAC,,, | Performed by: PHYSICIAN ASSISTANT

## 2020-03-04 PROCEDURE — 99205 OFFICE O/P NEW HI 60 MIN: CPT | Mod: S$GLB,,, | Performed by: PHYSICIAN ASSISTANT

## 2020-03-04 PROCEDURE — 99205 PR OFFICE/OUTPT VISIT, NEW, LEVL V, 60-74 MIN: ICD-10-PCS | Mod: S$GLB,,, | Performed by: PHYSICIAN ASSISTANT

## 2020-03-04 RX ORDER — IBUPROFEN 800 MG/1
800 TABLET ORAL DAILY
Status: ON HOLD | COMMUNITY
Start: 2020-01-22 | End: 2020-06-11 | Stop reason: HOSPADM

## 2020-03-04 RX ORDER — ALBUTEROL SULFATE 0.83 MG/ML
2.5 SOLUTION RESPIRATORY (INHALATION)
COMMUNITY
Start: 2018-08-10

## 2020-03-04 RX ORDER — BUDESONIDE AND FORMOTEROL FUMARATE DIHYDRATE 160; 4.5 UG/1; UG/1
AEROSOL RESPIRATORY (INHALATION)
COMMUNITY
Start: 2019-12-09

## 2020-03-04 RX ORDER — ALBUTEROL SULFATE 90 UG/1
AEROSOL, METERED RESPIRATORY (INHALATION)
COMMUNITY
Start: 2019-01-17 | End: 2020-05-25 | Stop reason: CLARIF

## 2020-03-04 RX ORDER — FLUTICASONE FUROATE, UMECLIDINIUM BROMIDE AND VILANTEROL TRIFENATATE 100; 62.5; 25 UG/1; UG/1; UG/1
1 POWDER RESPIRATORY (INHALATION) DAILY
COMMUNITY
Start: 2020-02-13

## 2020-03-04 RX ORDER — ASPIRIN 81 MG/1
81 TABLET ORAL DAILY
Status: ON HOLD | COMMUNITY
End: 2020-06-11 | Stop reason: HOSPADM

## 2020-03-04 NOTE — LETTER
March 9, 2020      Alona Davidson NP  [address] [address]           [facility] Neuro[facility] 7th Fl  [address]  [place] [address]  Phone: [phone]  Fax: [phone]          Patient: Kingsley Holbrook   MR Number: [id number]   YOB: 1950   Date of Visit: 3/4/2020       Dear Alona Davidson:    Thank you for referring Kingsley Holbrook to me for evaluation. Attached you will find relevant portions of my assessment and plan of care.    If you have questions, please do not hesitate to call me. I look forward to following Kingsley Holbrook along with you.    Sincerely,    TONIO Francis    Enclosure  CC:  No Recipients    If you would like to receive this communication electronically, please contact [email or web address] or [phone] to request more information on [facility] [facility] access.    For providers and/or their staff who would like to refer a patient to [facility], please contact us through our one-stop-shop provider referral line, [facility], at [phone].    If you feel you have received this communication in error or would no longer like to receive these types of communications, please e-mail [email or web address]

## 2020-03-05 ENCOUNTER — TELEPHONE (OUTPATIENT)
Dept: NEUROSURGERY | Facility: CLINIC | Age: 70
End: 2020-03-05

## 2020-03-05 DIAGNOSIS — M48.062 NEUROGENIC CLAUDICATION DUE TO LUMBAR SPINAL STENOSIS: ICD-10-CM

## 2020-03-05 DIAGNOSIS — M47.26 OSTEOARTHRITIS OF SPINE WITH RADICULOPATHY, LUMBAR REGION: Primary | ICD-10-CM

## 2020-03-09 PROBLEM — M51.26 LUMBAR DISC HERNIATION: Status: ACTIVE | Noted: 2020-03-09

## 2020-03-09 PROBLEM — M48.062 LUMBAR STENOSIS WITH NEUROGENIC CLAUDICATION: Status: ACTIVE | Noted: 2020-03-09

## 2020-03-09 PROBLEM — G89.29 CHRONIC LEFT-SIDED LOW BACK PAIN WITHOUT SCIATICA: Status: ACTIVE | Noted: 2020-03-09

## 2020-03-09 PROBLEM — M47.816 FACET ARTHROPATHY, LUMBAR: Status: ACTIVE | Noted: 2020-03-09

## 2020-03-09 PROBLEM — M47.816 LUMBAR SPONDYLOSIS: Status: ACTIVE | Noted: 2020-03-09

## 2020-03-09 PROBLEM — M51.36 DEGENERATIVE DISC DISEASE, LUMBAR: Status: ACTIVE | Noted: 2020-03-09

## 2020-03-09 PROBLEM — M54.50 CHRONIC LEFT-SIDED LOW BACK PAIN WITHOUT SCIATICA: Status: ACTIVE | Noted: 2020-03-09

## 2020-03-09 PROBLEM — Z72.0 TOBACCO ABUSE: Status: ACTIVE | Noted: 2020-03-09

## 2020-03-10 ENCOUNTER — TELEPHONE (OUTPATIENT)
Dept: PREADMISSION TESTING | Facility: HOSPITAL | Age: 70
End: 2020-03-10

## 2020-03-10 DIAGNOSIS — Z01.818 PREOPERATIVE TESTING: Primary | ICD-10-CM

## 2020-03-10 RX ORDER — METHOCARBAMOL 750 MG/1
500 TABLET, FILM COATED ORAL 3 TIMES DAILY
Status: ON HOLD | COMMUNITY
End: 2020-06-24 | Stop reason: SDUPTHER

## 2020-03-10 NOTE — PRE-PROCEDURE INSTRUCTIONS
Patient stated this is his first surgery. He did say that he had his wisdom teeth extracted in his dentist office and did not have any problem with that anesthesia.Will need medical optimiaztion by Dr. Tavares. Will also need poc appt, labs and ekg. Our  will call to set up these appts. Preop instructions given. Hold asa, asa containing products, nsaids, vitamins and supplements one week prior to surgery.He stated he is on asa 81 mg for prevention. Verbalizes understanding.

## 2020-03-10 NOTE — TELEPHONE ENCOUNTER
----- Message from Meenakshi Grissom RN sent at 3/10/2020 10:11 AM CDT -----  Surgery 4/2  Please schedule Dr. Tavares, poc appt, labs and ekg.  Thanks!

## 2020-03-10 NOTE — ANESTHESIA PAT ROS NOTE
03/10/2020  Kingsley Holbrook is a 69 y.o., male.      Pre-op Assessment         Review of Systems  Anesthesia Hx:  No previous Anesthesia  Denies Family Hx of Anesthesia complications.    Hematology/Oncology:  Hematology Normal   Oncology Normal     Cardiovascular:    Denies Angina.  Functional Capacity good / => 4 METS  Peripheral Arterial Disease  Hypertension , Fairly Controlled on RX , Recent typical clinic B/P of 176/80    Pulmonary:   Denies Shortness of breath.  Denies Recent URI.  Chronic Obstructive Pulmonary Disease (COPD):  is secondary to smoking. Inhaler use is maintenance inhaler daily and rescue inhaler PRN.  Possible Obstructive Sleep Apnea , (STOP/BANG) Symptoms S - Snoring (loud), A - Age > 50 and G - Gender (Male > Female)        Renal/:  Renal/ Normal     Hepatic/GI:  Hepatic/GI Normal    Musculoskeletal:  Musculoskeletal General/Symptoms: low back pain. Functional capacity is ambulatory with cane.  Spine Disorders: Spinal Stenosis, Lumbar Spinal Stenosis Lumbar Spine Disorders, Lumbar Disc Disease   Neurological:  Pain , onset is chronic , location of back pain , severity is a 8 , alleviating factors are prn advil.   Endocrine:  Endocrine Normal    Psych:  Psychiatric Normal           Physical Exam  General:  Well nourished    Airway/Jaw/Neck:  Airway Findings: Mouth Opening: Normal Tongue: Normal  Jaw/Neck Findings:  Neck ROM: Normal ROM      Dental:  Dental Findings: Upper partial dentures, In tact   Chest/Lungs:  Chest/Lungs Findings: Clear to auscultation     Heart/Vascular:  Heart Findings: Rate: Normal  Rhythm: Regular Rhythm  Sounds: Normal        Mental Status:  Mental Status Findings:  Cooperative, Alert and Oriented       5/28/20 EKG, Lab and urine results noted. Cxray pending.  Dr Tavares clearance noted:  Preoperative cardiac risk assessment-  The patient does not have any  active cardiac conditions . Revised cardiac risk index predictors- 0---.Functional capacity is more than 4 Mets. He will be undergoing a Spine procedure that carries a intermediate risk      Risk of a major Cardiac event ( Defined as death, myocardial infarction, or cardiac arrest at 30 days after noncardiac surgery), based on RCRI score      -3.9%            No further cardiac work up is indicated prior to proceeding with the surgery          Orders Placed This Encounter    Ambulatory referral/consult to Smoking Cessation Program    amLODIPine (NORVASC) 5 MG tablet         American Society of Anesthesiologists Physical status classification ( ASA ) class: 3     Postoperative pulmonary complication risk assessment:      ARISCAT ( Canet) risk index- risk class -  Low, if duration of surgery is under 3 hours, intermediate, if duration of surgery is over 3 hours     6/1/20 Cxray results noted. Pt aware he will need Covid prior to surgery, order noted in Epic.      Anesthesia Assessment: Preoperative EQUATION    Planned Procedure: Procedure(s) (LRB):  FUSION, SPINE, LUMBAR, TLIF, MINIMALLY INVASIVE (Left)  Requested Anesthesia Type:General  Surgeon: Terry Ba MD  Service: Neurosurgery  Known or anticipated Date of Surgery:4/2/2020, Date change 6/8/2020 Date change 6/11/2020    Surgeon notes: reviewed    Electronic QUestionnaire Assessment completed via nurse interview with patient.        Triage considerations:       Previous anesthesia records:None and **This is his first surgery**   He said he had his wisdom teeth out in the dentist's office with no problems with anesthesia.    Last PCP note: > 1 year ago , outside [facility]   Subspecialty notes: Neurosurgery    Other important co-morbidities:per epic: COPD, HLD, HTN, Smoker and Prediabetes, PVD      Tests already available:  Available tests,  within 3 months , within [facility] .2/13/2020 TSH, HGA1C, CBC, CMP, LIPIDS,& UA             Instructions given. (See in  Nurse's note)    Optimization:  Anesthesia Preop Clinic Assessment  Indicated    Medical Opinion Indicated           Plan:    Testing:  BMP, CBC, EKG, PT/INR, PTT, T&C, T&S and UA   Pre-anesthesia  visit       Visit focus: concerns in complex and/or prolonged anesthesia     Consultation:IM Perioperative Hospitalist     Patient  has previously scheduled Medical Appointment:NONE    Navigation: Tests Scheduled.TBD              Consults scheduled.TBD             Results will be tracked by Preop Clinic.   5/6 Surgery was postponed from 4/2 due to COVID-19 pandemic.  Meenakshi Grissom RN BSN

## 2020-03-12 ENCOUNTER — TELEPHONE (OUTPATIENT)
Dept: PREADMISSION TESTING | Facility: HOSPITAL | Age: 70
End: 2020-03-12

## 2020-03-12 NOTE — TELEPHONE ENCOUNTER
----- Message from Lacey De La Rosa sent at 3/10/2020  4:48 PM CDT -----  Contact: self @ [phone]  Pt says he is returning your call.

## 2020-03-18 ENCOUNTER — TELEPHONE (OUTPATIENT)
Dept: NEUROSURGERY | Facility: CLINIC | Age: 70
End: 2020-03-18

## 2020-03-18 NOTE — TELEPHONE ENCOUNTER
Called pt re: postponing surgery 2/2 to [facility] of Health edict. Pt v/u. His insurance is not accepted here and has been denied. He will work on this.

## 2020-03-18 NOTE — TELEPHONE ENCOUNTER
The pt has been told several times by pre-service that it is out of Dr Ba's hands.    Asked Ivonne Davis to call pt. And explain    ----- Message from Janice Neville sent at 3/18/2020  9:16 AM CDT -----  Contact: Pt  Reason: Pt calling to speak with someone regard to his surgery he stated Dr. Ba have to do a peer to peer with insurance. Pt stated insurance needs an out of network referral. Please call     Communication: [phone]

## 2020-03-18 NOTE — PRE-PROCEDURE INSTRUCTIONS
Reviewed Preop instructions. Hold asa, asa containing products, nsaids, vitamins and supplements one week prior to surgery.Medication instructions given.   Shower the night before surgery and the morning of surgery with an antibacterial soap( hibiclens or dial antibacterial soap).  Nothing on the skin once shower. Do not apply any deodorant, lotion, powder, perfume,or aftershave.  No jewelry going to surgery.  May have solid foods, gum, and hard candy until 8 hours before surgery/procedure time.  May have clear liquids( water, gatorade, powerade or apple juice) until 2 hours prior to surgery/procedure time.  No red  drinks. If in doubt , drink water. Nothing to drink 2 hours before arrival time for surgery/procedure. If you are told to take medication in the morning of surgery, it may be taken with a sip of water. If your doctor tells you something different pertaining to when to stop eating or drinking, follow your doctor's instructions. Call for changes in status or questions.Stated knows where the surgery center is located.Verbalizes understanding.Mailed preop and med instructions.

## 2020-03-24 DIAGNOSIS — Z01.818 PREOPERATIVE TESTING: Primary | ICD-10-CM

## 2020-03-27 ENCOUNTER — HOSPITAL ENCOUNTER (OUTPATIENT)
Dept: PREADMISSION TESTING | Facility: HOSPITAL | Age: 70
Discharge: HOME | End: 2020-03-27

## 2020-05-04 ENCOUNTER — TELEPHONE (OUTPATIENT)
Dept: NEUROSURGERY | Facility: CLINIC | Age: 70
End: 2020-05-04

## 2020-05-04 NOTE — TELEPHONE ENCOUNTER
Returned pt's call  hE CHANGED HIS INSURANCE AND, PER Dany CarrionTIMOTEO IS IN NETWORK.    sENT PT CODE TO SET UP v.v. wILL MAKE APPT SOON TO r/s SURGERY.      ----- Message from Melvina Foster sent at 5/4/2020 11:21 AM CDT -----  Contact: Pt. [phone]  The patient would like to speak to someone regarding his surgery. Please contact the patient to discuss further.

## 2020-05-05 NOTE — PATIENT PROFILE ADULT. - NS PRO TALK SOMEONE YN
Interdisciplinary Rounds completed. Nursing, Case Management, and Physician  present. Plan of care reviewed and updated.  
 
Bladder train tonight and then remove grossman in am. 
 no

## 2020-05-06 ENCOUNTER — TELEPHONE (OUTPATIENT)
Dept: NEUROSURGERY | Facility: CLINIC | Age: 70
End: 2020-05-06

## 2020-05-06 DIAGNOSIS — Z01.818 PRE-OP TESTING: Primary | ICD-10-CM

## 2020-05-06 DIAGNOSIS — Z01.818 PREOPERATIVE TESTING: Primary | ICD-10-CM

## 2020-05-06 NOTE — PRE-PROCEDURE INSTRUCTIONS
Patient stated this is his first surgery.He said his insurance has been taken care of for this surgery.Will need medical optimization by Dr. Tavares. Will also need poc appt, labs, ua. and ekg. Our  will call to set up these appts. Preop instructions given. Hold asa, asa containing products, nsaids, vitamins and supplements one week prior to surgery.He stated he is on asa 81 mg for prevention. Verbalizes understanding.

## 2020-05-25 ENCOUNTER — HOSPITAL ENCOUNTER (OUTPATIENT)
Dept: PREADMISSION TESTING | Facility: HOSPITAL | Age: 70
Discharge: HOME | End: 2020-05-25
Attending: HOSPITALIST
Payer: COMMERCIAL

## 2020-05-25 ENCOUNTER — HOSPITAL ENCOUNTER (OUTPATIENT)
Dept: CARDIOLOGY | Facility: CLINIC | Age: 70
Discharge: HOME | End: 2020-05-25
Attending: ANESTHESIOLOGY
Payer: COMMERCIAL

## 2020-05-25 ENCOUNTER — INITIAL CONSULT (OUTPATIENT)
Dept: INTERNAL MEDICINE | Facility: CLINIC | Age: 70
End: 2020-05-25
Payer: COMMERCIAL

## 2020-05-25 ENCOUNTER — LAB VISIT (OUTPATIENT)
Dept: LAB | Facility: HOSPITAL | Age: 70
End: 2020-05-25
Attending: ANESTHESIOLOGY
Payer: COMMERCIAL

## 2020-05-25 VITALS
WEIGHT: 188 LBS | RESPIRATION RATE: 16 BRPM | TEMPERATURE: 98 F | BODY MASS INDEX: 26.32 KG/M2 | OXYGEN SATURATION: 96 % | SYSTOLIC BLOOD PRESSURE: 176 MMHG | HEIGHT: 71 IN | HEART RATE: 67 BPM | DIASTOLIC BLOOD PRESSURE: 80 MMHG

## 2020-05-25 DIAGNOSIS — Z79.1 NSAID LONG-TERM USE: ICD-10-CM

## 2020-05-25 DIAGNOSIS — I65.29 CAROTID ATHEROSCLEROSIS, UNSPECIFIED LATERALITY: ICD-10-CM

## 2020-05-25 DIAGNOSIS — R09.89 CHEST SOUNDS ABNORMAL ON PERCUSSION OR AUSCULTATION: ICD-10-CM

## 2020-05-25 DIAGNOSIS — Z01.818 PREOPERATIVE TESTING: ICD-10-CM

## 2020-05-25 DIAGNOSIS — J44.9 COPD WITHOUT EXACERBATION: ICD-10-CM

## 2020-05-25 DIAGNOSIS — I73.9 PVD (PERIPHERAL VASCULAR DISEASE): ICD-10-CM

## 2020-05-25 DIAGNOSIS — Z79.02 LONG TERM (CURRENT) USE OF ANTITHROMBOTICS/ANTIPLATELETS: ICD-10-CM

## 2020-05-25 DIAGNOSIS — Z01.818 PREOP EXAMINATION: Primary | ICD-10-CM

## 2020-05-25 DIAGNOSIS — I10 ESSENTIAL HYPERTENSION: ICD-10-CM

## 2020-05-25 DIAGNOSIS — R73.03 PREDIABETES: ICD-10-CM

## 2020-05-25 DIAGNOSIS — Z72.0 TOBACCO ABUSE: ICD-10-CM

## 2020-05-25 LAB
ABO + RH BLD: NORMAL
ANION GAP SERPL CALC-SCNC: 10 MMOL/L (ref 8–16)
APTT BLDCRRT: 29.2 SEC (ref 21–32)
BASOPHILS # BLD AUTO: 0.04 K/UL (ref 0–0.2)
BASOPHILS NFR BLD: 0.5 % (ref 0–1.9)
BLD GP AB SCN CELLS X3 SERPL QL: NORMAL
BUN SERPL-MCNC: 12 MG/DL (ref 8–23)
CALCIUM SERPL-MCNC: 9.4 MG/DL (ref 8.7–10.5)
CHLORIDE SERPL-SCNC: 105 MMOL/L (ref 95–110)
CO2 SERPL-SCNC: 27 MMOL/L (ref 23–29)
CREAT SERPL-MCNC: 1 MG/DL (ref 0.5–1.4)
DIFFERENTIAL METHOD: ABNORMAL
EOSINOPHIL # BLD AUTO: 0.3 K/UL (ref 0–0.5)
EOSINOPHIL NFR BLD: 3.8 % (ref 0–8)
ERYTHROCYTE [DISTWIDTH] IN BLOOD BY AUTOMATED COUNT: 13.5 % (ref 11.5–14.5)
EST. GFR  (AFRICAN AMERICAN): >60 ML/MIN/1.73 M^2
EST. GFR  (NON AFRICAN AMERICAN): >60 ML/MIN/1.73 M^2
GLUCOSE SERPL-MCNC: 98 MG/DL (ref 70–110)
HCT VFR BLD AUTO: 47 % (ref 40–54)
HGB BLD-MCNC: 14.5 G/DL (ref 14–18)
IMM GRANULOCYTES # BLD AUTO: 0.03 K/UL (ref 0–0.04)
IMM GRANULOCYTES NFR BLD AUTO: 0.4 % (ref 0–0.5)
INR PPP: 1 (ref 0.8–1.2)
LYMPHOCYTES # BLD AUTO: 1.8 K/UL (ref 1–4.8)
LYMPHOCYTES NFR BLD: 23 % (ref 18–48)
MCH RBC QN AUTO: 30 PG (ref 27–31)
MCHC RBC AUTO-ENTMCNC: 30.9 G/DL (ref 32–36)
MCV RBC AUTO: 97 FL (ref 82–98)
MONOCYTES # BLD AUTO: 0.8 K/UL (ref 0.3–1)
MONOCYTES NFR BLD: 9.5 % (ref 4–15)
NEUTROPHILS # BLD AUTO: 5 K/UL (ref 1.8–7.7)
NEUTROPHILS NFR BLD: 62.8 % (ref 38–73)
NRBC BLD-RTO: 0 /100 WBC
PLATELET # BLD AUTO: 247 K/UL (ref 150–350)
PMV BLD AUTO: 9.4 FL (ref 9.2–12.9)
POTASSIUM SERPL-SCNC: 4.6 MMOL/L (ref 3.5–5.1)
PROTHROMBIN TIME: 10.3 SEC (ref 9–12.5)
RBC # BLD AUTO: 4.83 M/UL (ref 4.6–6.2)
SODIUM SERPL-SCNC: 142 MMOL/L (ref 136–145)
WBC # BLD AUTO: 7.92 K/UL (ref 3.9–12.7)

## 2020-05-25 PROCEDURE — 80048 BASIC METABOLIC PNL TOTAL CA: CPT

## 2020-05-25 PROCEDURE — 93010 EKG 12-LEAD: ICD-10-PCS | Mod: S$GLB,,, | Performed by: INTERNAL MEDICINE

## 2020-05-25 PROCEDURE — 99999 PR PBB SHADOW E&M-EST. PATIENT-LVL II: ICD-10-PCS | Mod: PBBFAC,,, | Performed by: HOSPITALIST

## 2020-05-25 PROCEDURE — 93010 ELECTROCARDIOGRAM REPORT: CPT | Mod: S$GLB,,, | Performed by: INTERNAL MEDICINE

## 2020-05-25 PROCEDURE — 93005 EKG 12-LEAD: ICD-10-PCS | Mod: S$GLB,,, | Performed by: ANESTHESIOLOGY

## 2020-05-25 PROCEDURE — 36415 COLL VENOUS BLD VENIPUNCTURE: CPT

## 2020-05-25 PROCEDURE — 99204 OFFICE O/P NEW MOD 45 MIN: CPT | Mod: S$GLB,,, | Performed by: HOSPITALIST

## 2020-05-25 PROCEDURE — 86901 BLOOD TYPING SEROLOGIC RH(D): CPT

## 2020-05-25 PROCEDURE — 85610 PROTHROMBIN TIME: CPT

## 2020-05-25 PROCEDURE — 99204 PR OFFICE/OUTPT VISIT, NEW, LEVL IV, 45-59 MIN: ICD-10-PCS | Mod: S$GLB,,, | Performed by: HOSPITALIST

## 2020-05-25 PROCEDURE — 85025 COMPLETE CBC W/AUTO DIFF WBC: CPT

## 2020-05-25 PROCEDURE — 99999 PR PBB SHADOW E&M-EST. PATIENT-LVL II: CPT | Mod: PBBFAC,,, | Performed by: HOSPITALIST

## 2020-05-25 PROCEDURE — 85730 THROMBOPLASTIN TIME PARTIAL: CPT

## 2020-05-25 PROCEDURE — 93005 ELECTROCARDIOGRAM TRACING: CPT | Mod: S$GLB,,, | Performed by: ANESTHESIOLOGY

## 2020-05-25 RX ORDER — AMLODIPINE BESYLATE 5 MG/1
5 TABLET ORAL DAILY
Qty: 30 TABLET | Refills: 1 | Status: SHIPPED | OUTPATIENT
Start: 2020-05-25 | End: 2021-05-25

## 2020-05-25 RX ORDER — ASCORBIC ACID 500 MG
500 TABLET ORAL DAILY
COMMUNITY

## 2020-05-25 NOTE — LETTER
May 25, 2020      Alona Davidson, BRIE  [address] [address]           [facility] - Pre Op Consult  [address] [address]  Phone: [phone]          Patient: Kingsley Holbrook   MR Number: [id number]   YOB: 1950   Date of Visit: 5/25/2020       Dear Alona Davidson:    Thank you for referring Kingsley Holbrook to me for evaluation. Attached you will find relevant portions of my assessment and plan of care.    If you have questions, please do not hesitate to call me. I look forward to following Kingsley Holbrook along with you.    Sincerely,    Jaz Tavares MD    Enclosure  CC:  No Recipients    If you would like to receive this communication electronically, please contact [email or web address] or [phone] to request more information on [email or web address] Link access.    For providers and/or their staff who would like to refer a patient to [facility], please contact us through our one-stop-shop provider referral line, [facility] , at [phone].    If you feel you have received this communication in error or would no longer like to receive these types of communications, please e-mail [email or web address]

## 2020-05-25 NOTE — ASSESSMENT & PLAN NOTE
He plans on stopping tobacco after today ( 5/25)   Surgery - June 8 th 2020   Tobacco use- Smokes Cigarettes- quarter  Half  PPD- I suggested to consider stopping  smoking tobacco for its benefits in the lety operative period and in the long term  Tobacco use-I  Informed about risk of wound healing problem ,infection,lung complications,thrombosis with tobacco use    Scanned vaccine questionnaire in patient docs.

## 2020-05-25 NOTE — ASSESSMENT & PLAN NOTE
ASA 81 mg daily   Preventive reason   No CVA/TIA, Coronary artery disease   Holding ASA 1 week  for surgery

## 2020-05-25 NOTE — ASSESSMENT & PLAN NOTE
2018   No evidence of hemodynamically significant stenosis in the extracranial carotid arteries bilaterally  No stroke , TIA

## 2020-05-25 NOTE — HPI
History of present illness- I had the pleasure of meeting this pleasant 69 y.o. gentleman in the pre op clinic prior to his elective spine surgery. The patient is new to me .     I have obtained the history by speaking to the patient and by reviewing the electronic health records.    Events leading up to surgery / History of presenting illness -    He has been troubled with severe  Low back pian, Left hip pain  pain for 1 year  . Pain increases with physical and sexual activity  activity and decreases with Ibuprofen   Played basket ball in the past and he believes that caused back problem  Was in a physically active job    Relevant health conditions of significance for the perioperative period/ History of presenting illness -    Subjectively describes health as good     Health conditions of significance for the perioperative period     COPD     Tobacco     HTN    Lives alone   [facility]   4 th floor   Girl friend can help     Not known to have heart disease , Diabetes Mellitus

## 2020-05-25 NOTE — ASSESSMENT & PLAN NOTE
Ibuprofen 1.5 years   Takes it with food   Ulcer , renal effects discussed   Hopefully will require in the long term

## 2020-05-25 NOTE — ASSESSMENT & PLAN NOTE
Diagnosed 2019   Smokes tobacco   On Trelegy ( Inhaled corticosteroid , Beta agonist , Anticholinergic ) - daily   Breathing not a problem per him   Stays fairly active ( Was going to gym until 6 months ago )   No SOB on usual activities other than sexual activity   No long standing  phlegm   Occasional , infrequent wheeze   Not requiring Rescue inhaler   To continue using Trelegy lety op  Knows about rinsing mouth after use      CT 2018     There are small emphysematous blebs involving each apex but no large bullous

## 2020-05-25 NOTE — DISCHARGE INSTRUCTIONS
Your surgery has been scheduled for:__________________________________________    You should report to:  ____[facility], located on the [place] side of the first floor of the           [facility] ([phone])  ____The [facility], located on the [facility] side of the            Second floor of the [facility] ([phone])  ____[facility] [address] [address]  Please Note   - Tell your doctor if you take Aspirin, products containing Aspirin, herbal medications  or blood thinners, such as Coumadin, Ticlid, or Plavix.  (Consult your provider regarding holding or stopping before surgery).  - Arrange for someone to drive you home following surgery.  You will not be allowed to leave the surgical facility alone or drive yourself home following sedation and anesthesia.  Before Surgery  - Stop taking all herbal medications 14days prior to surgery  - No Motrin/Advil (Ibuprofen) 7 days before surgery  - No Aleve (Naproxen) 7 days before surgery  - Stop Taking Aspirin, products containing Aspirin _____days before surgery  - Stop taking blood thinners_______days before surgery  - No Goody's/BC  Powder 7 days before surgery  - Refrain from drinking alcoholic beverages for 24hours before and after surgery  - Stop or limit smoking _________days before surgery  - You may take Tylenol for pain  Night before Surgery   Stop ALL solid food, gum, candy (including vitamins) 8 hours before arrival time.  (Please note: If your surgeon gives you different eating and drinking instructions, please follow surgeon's directions.)   Stop all CLOUDY liquids: coffee with creamer, formula, tube feeds, cloudy juices, non-human milk and breast milk with additives, 6 hours prior to arrival time.   Stop plain breast milk 4 hours prior to arrival time.   The patient should be ENCOURAGED to drink carbohydrate-rich clear liquids (sports  drinks, clear juices) until 2 hours prior to arrival time.   CLEAR liquids include only water, black coffee NO creamer, clear oral rehydration drinks, clear sports drinks or clear fruit juices (no orange juice, no pulpy juices, no apple cider). Advise patients if they can read newsprint through the liquid, it qualifies as clear liquid.    IF IN DOUBT, drink water instead.   - Take a shower or bath (shower is recommended).  Bathe with Hibiclens soap or an antibacterial soap from the neck down.  If not supplied by your surgeon, hibiclens soap will need to be purchased over the counter in pharmacy.  Rinse soap off thoroughly.  - Shampoo your hair with your regular shampoo  The Day of Surgery  · NOTHING TO  DRINK 2 hours before arrival time. If you are told to take medication on the morning of surgery, it may be taken with a sip of water.   - Take another bath or shower with hibiclens or any antibacterial soap, to reduce the chance of infection.  - Take heart and blood pressure medications with a small sip of water, as advised by the perioperative team.  - Do not take fluid pills  - You may brush your teeth and rinse your mouth, but do not swallow any additional water.   - Do not apply perfumes, powder, body lotions or deodorant on the day of surgery.  - Nail polish should be removed.  - Do not wear makeup or moisturizer  - Wear comfortable clothes, such as a button front shirt and loose fitting pants.  - Leave all jewelry, including body piercings, and valuables at home.    - Bring any devices you will neeed after surgery such as crutches or canes.  - If you have sleep apnea, please bring your CPAP machine  In the event that your physical condition changes including the onset of a cold or respiratory illness, or if you have to delay or cancel your surgery, please notify your surgeon.  Anesthesia: General Anesthesia     You are watched continuously during your procedure by your anesthesia provider.     Youre due to  have surgery. During surgery, youll be given medicine called anesthesia or anesthetic. This will keep you comfortable and pain-free. Your anesthesia provider will use general anesthesia.  What is general anesthesia?  General anesthesia puts you into a state like deep sleep. It goes into the bloodstream (IV anesthetics), into the lungs (gas anesthetics), or both. You feel nothing during the procedure. You will not remember it. During the procedure, the anesthesia provider monitors you continuously. He or she checks your heart rate and rhythm, blood pressure, breathing, and blood oxygen.  · IV anesthetics. IV anesthetics are given through an IV line in your arm. Theyre often given first. This is so you are asleep before a gas anesthetic is started. Some kinds of IV anesthetics relieve pain. Others relax you. Your doctor will decide which kind is best in your case.  · Gas anesthetics. Gas anesthetics are breathed into the lungs. They are often used to keep you asleep. They can be given through a facemask or a tube placed in your larynx or trachea (breathing tube).  ? If you have a facemask, your anesthesia provider will most likely place it over your nose and mouth while youre still awake. Youll breathe oxygen through the mask as your IV anesthetic is started. Gas anesthetic may be added through the mask.  ? If you have a tube in the larynx or trachea, it will be inserted into your throat after youre asleep.  Anesthesia tools and medicines  You will likely have:  · IV anesthetics. These are put into an IV line into your bloodstream.  · Gas anesthetics. You breathe these anesthetics into your lungs, where they pass into your bloodstream.  · Pulse oximeter. This is a small clip that is attached to the end of your finger. This measures your blood oxygen level.  · Electrocardiography leads (electrodes). These are small sticky pads that are placed on your chest. They record your heart rate and rhythm.  · Blood pressure  cuff. This reads your blood pressure.  Risks and possible complications  General anesthesia has some risks. These include:  · Breathing problems  · Nausea and vomiting  · Sore throat or hoarseness (usually temporary)  · Allergic reaction to the anesthetic  · Irregular heartbeat (rare)  · Cardiac arrest (rare)   Anesthesia safety  · Follow all instructions you are given for how long not to eat or drink before your procedure.  · Be sure your doctor knows what medicines and drugs you take. This includes over-the-counter medicines, herbs, supplements, alcohol or other drugs. You will be asked when those were last taken.  · Have an adult family member or friend drive you home after the procedure.  · For the first 24 hours after your surgery:  ? Do not drive or use heavy equipment.  ? Do not make important decisions or sign legal documents. If important decisions or signing legal documents is necessary during the first 24 hours after surgery, have a trusted family member or spouse act on your behalf.  ? Avoid alcohol.  ? Have a responsible adult stay with you. He or she can watch for problems and help keep you safe.  Date Last Reviewed: 12/1/2016 © 2000-2017 [employer]. [address] [place] [address]. All rights reserved. This information is not intended as a substitute for professional medical care. Always follow your healthcare professional's instructions

## 2020-05-25 NOTE — ASSESSMENT & PLAN NOTE
Jan 2019 - 6.0   No family history of diabetes   Prediabetes- I suggest monitoring the glucose in the perioperative period as  glucose may be high from stress hyperglycemia in which case Insulin may be required  Hemoglobin A1c in the pre diabetic range   Weight loss with diet and exercise , if able helps lower A1c  Increased risk of becoming a diabetic   Needs follow up     A1c ordered
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
## 2020-05-25 NOTE — ASSESSMENT & PLAN NOTE
2018     Moderate to severe stenosis of the left superficial femoral artery at its proximal and distal aspect   Moderate to severe stenosis of the right profunda artery    No suggestion of lower extremity claudication

## 2020-05-25 NOTE — OUTPATIENT SUBJECTIVE & OBJECTIVE
Outpatient Subjective & Objective     Chief complaint-Preoperative evaluation, Perioperative Medical management, complication reduction plan     Active cardiac conditions- none    Revised cardiac risk index predictors- none    Functional capacity -Examples of physical activity  Was going to gyn until 6 months ago ( 5-6 days a week ) , was doing treadmill, weights , bicycle    can take a flight of stairs holding on to the railing----- He can undertake all the above activities without  chest pain,chest tightness,  ,dizziness,lightheadedness making his exercise tolerance more,   than 4 Mets.   Winded on heavier exertion, not new ,attributes to tobacco stable over time     Review of Systems   Constitutional: Negative for chills and fever.        No unusual weight changes     HENT:        STOPBANG score 4 / 8      Elevated BP  Age over 50 years  Neck size over 40 CM  Male gender   Eyes:        No unusual vision changes- None    Respiratory:          Cough with clear  phlegm   Most mornings  No change in color , consistency, amount of phlegm   No Hemoptysis   Cardiovascular:        As noted   Gastrointestinal:        Bowels- Regular - daily  No overt GI/ blood losses   Endocrine:        Prednisone use > 20 mg daily for 3 weeks- None   Genitourinary: Negative for dysuria.        No urinary hesitancy    Musculoskeletal:        As above      Skin: Negative for rash.   Neurological: Negative for syncope.        No unilateral weakness   Hematological:        Current use of Anticoagulants  None   Aspirin use for preventive reasons    Psychiatric/Behavioral:        No Depression,Anxiety     No vascular stenting     No past medical history pertinent negatives.  No family history on file.  No past surgical history on file.    No anesthesia, bleeding, cardiac problems, PONV with previous surgeries/procedures.  Medications and Allergies reviewed in epic.   FH- No anesthesia,bleeding / venous thrombosis , problems in family  "    Physical Exam    Vitals - 1 value per visit 5/25/2020   SYSTOLIC 176   DIASTOLIC 80   PULSE 67   TEMPERATURE 98.2   RESPIRATIONS 16   SPO2 96   Weight (lb) 188   Weight (kg) 85.276   HEIGHT 5' 11"   BODY MASS INDEX 26.22       Physical Exam  Constitutional- General appearance-Conscious,Coherent  Eyes- No conjunctival icterus,pupils unable to examine discs and  round   ENT-Oral cavity- moist  and  upper partial denture  , Hearing grossly normal   Neck- No thyromegaly ,Trachea -central, No jugular venous distension,   No Carotid Bruit   Cardiovascular -Heart Sounds-manual BP /78- no head ache,  Normal ,  no murmur  and  occasional irregularity suggestive of ectopy   , No gallop rhythm   Respiratory - Decreased air entry Rt side of lung, COPD, Emphysema, Mild wheeze Rt base , Normal Respiratory Effort,  no forced expiratory wheeze  and  wheeze    Peripheral pitting pedal edema-- none , no calf pain   Gastrointestinal -Soft abdomen, No palpable masses, Non Tender,Liver,Spleen not palpable. No-- free fluid and shifting dullness  Musculoskeletal- No finger Clubbing. Strength grossly normal   Lymphatic-No Palpable cervical, axillary,Inguinal lymphadenopathy   Psychiatric - normal effect,Orientation  Rt Dorsalis pedis pulses-palpable    Lt Dorsalis pedis pulses- palpable   Rt Posterior tibial pulses -palpable   Left posterior tibial pulses -palpable   Miscellaneous -  no renal bruit  Investigations  Lab and Imaging have been reviewed in [facility]    Review of Medicine tests      Review of clinical lab tests:  Lab Results   Component Value Date    CREATININE 0.9 02/13/2020    HGB 14.7 02/13/2020     02/13/2020         2018      Dobutamine Myoview SPECT perfusion stress study is negative for reversible ischemia.  2. Normal left ventricular systolic function with calculated ejection fraction of 66%.    2018   1. Mildly decreased left ventricular systolic function.   2. Borderline dilated left atrium.   3. Aortic " valve sclerosis without stenosis.   4. LV diastolic function is mildly abnormal (Grade I).        Review of old records- Was done and information gathered regards to events leading to surgery and health conditions of significance in the perioperative period.    Outpatient Subjective & Objective

## 2020-05-25 NOTE — ASSESSMENT & PLAN NOTE
Diagnosed 2017/2016   Not on Medication for HTN  Does not like taking Medication / BP Medication   Home BP readings -170/?  Reports not eating sated food   Attributes BP to pain , tobacco  Recent BP readings in the record-150-160/70-80   Goal BP   Manual 125-130/< 80   Automated- 120-125 /< 80   Hypertension-  Blood pressure is elevated   Offered BP Medication that he initially   declined   He believes that he can get his BP under control  By the time of surgery   Suggested letting me know , if BP is elevated   He later agreed to take Medication   Will try Amlodipine  Edema effect discussed    I suggest  blood pressure monitoring .I suggest addressing pain control as uncontrolled pain can increased blood pressure     Suggested BP monitoring and let me know

## 2020-05-25 NOTE — ASSESSMENT & PLAN NOTE
Decreased air entry Rt side of lung, COPD, Emphysema, Mild wheeze Rt base   Tobacco smoker   Check X ray    -   Chest x ray from 5/28/2020    COPD, no acute active process

## 2020-05-26 ENCOUNTER — TELEPHONE (OUTPATIENT)
Dept: PREADMISSION TESTING | Facility: HOSPITAL | Age: 70
End: 2020-05-26

## 2020-05-26 NOTE — TELEPHONE ENCOUNTER
----- Message from Jessica Wren LPN sent at 5/25/2020  2:36 PM CDT -----  This patient needs CXR ever entered the order after the pt left I called no answer.

## 2020-05-28 ENCOUNTER — HOSPITAL ENCOUNTER (OUTPATIENT)
Dept: RADIOLOGY | Facility: HOSPITAL | Age: 70
Discharge: HOME | End: 2020-05-28
Attending: HOSPITALIST
Payer: COMMERCIAL

## 2020-05-28 DIAGNOSIS — R09.89 CHEST SOUNDS ABNORMAL ON PERCUSSION OR AUSCULTATION: ICD-10-CM

## 2020-05-28 PROCEDURE — 71046 X-RAY EXAM CHEST 2 VIEWS: CPT | Mod: TC

## 2020-05-28 PROCEDURE — 71046 XR CHEST PA AND LATERAL: ICD-10-PCS | Mod: 26,,, | Performed by: RADIOLOGY

## 2020-05-28 PROCEDURE — 71046 X-RAY EXAM CHEST 2 VIEWS: CPT | Mod: 26,,, | Performed by: RADIOLOGY

## 2020-06-04 ENCOUNTER — TELEPHONE (OUTPATIENT)
Dept: NEUROSURGERY | Facility: CLINIC | Age: 70
End: 2020-06-04

## 2020-06-04 NOTE — TELEPHONE ENCOUNTER
----- Message from Garth Jimenez sent at 6/4/2020  9:28 AM CDT -----  Contact: Patient @ [phone]  Patient requesting a return call about his arrival time for the 6-8th surgery patient states he has to notify transportation in advance, pls advise

## 2020-06-04 NOTE — TELEPHONE ENCOUNTER
Called pt. Discussed moving surgery to Thurs 6/11 and scheduling COVID test for Tuesday afternoon. Pt accepted dates/places/times

## 2020-06-09 ENCOUNTER — LAB VISIT (OUTPATIENT)
Dept: SURGERY | Facility: CLINIC | Age: 70
End: 2020-06-09
Payer: COMMERCIAL

## 2020-06-09 DIAGNOSIS — Z01.818 PRE-OP TESTING: ICD-10-CM

## 2020-06-09 PROCEDURE — U0003 INFECTIOUS AGENT DETECTION BY NUCLEIC ACID (DNA OR RNA); SEVERE ACUTE RESPIRATORY SYNDROME CORONAVIRUS 2 (SARS-COV-2) (CORONAVIRUS DISEASE [COVID-19]), AMPLIFIED PROBE TECHNIQUE, MAKING USE OF HIGH THROUGHPUT TECHNOLOGIES AS DESCRIBED BY CMS-2020-01-R: HCPCS

## 2020-06-10 ENCOUNTER — TELEPHONE (OUTPATIENT)
Dept: NEUROSURGERY | Facility: CLINIC | Age: 70
End: 2020-06-10

## 2020-06-10 LAB — SARS-COV-2 RNA RESP QL NAA+PROBE: NOT DETECTED

## 2020-06-10 NOTE — TELEPHONE ENCOUNTER
Patient contacted. Advised to arrive for surgery at 1300, [facility], NPO after MN the night before, shower x 2 with Hibiclens or Dial antibacterial soap. Understanding verbalized by patient.    ----- Message from Liz Horvath sent at 6/10/2020 10:38 AM CDT -----  Contact: patient  Please call above patient at [phone] said he have question for the nurse concerning surgery tomorrow waiting on a call from the nurse thanks.

## 2020-06-11 ENCOUNTER — HOSPITAL ENCOUNTER (OUTPATIENT)
Facility: HOSPITAL | Age: 70
Discharge: HOME | End: 2020-06-11
Attending: NEUROLOGICAL SURGERY | Admitting: NEUROLOGICAL SURGERY
Payer: COMMERCIAL

## 2020-06-11 VITALS
HEIGHT: 71 IN | DIASTOLIC BLOOD PRESSURE: 91 MMHG | HEART RATE: 63 BPM | BODY MASS INDEX: 26.6 KG/M2 | SYSTOLIC BLOOD PRESSURE: 191 MMHG | RESPIRATION RATE: 18 BRPM | TEMPERATURE: 98 F | OXYGEN SATURATION: 100 % | WEIGHT: 190 LBS

## 2020-06-11 DIAGNOSIS — Z01.818 PREOPERATIVE TESTING: Primary | ICD-10-CM

## 2020-06-11 DIAGNOSIS — M48.061 LUMBAR STENOSIS: ICD-10-CM

## 2020-06-11 DIAGNOSIS — M48.062 LUMBAR STENOSIS WITH NEUROGENIC CLAUDICATION: ICD-10-CM

## 2020-06-11 LAB
ABO + RH BLD: NORMAL
BLD GP AB SCN CELLS X3 SERPL QL: NORMAL

## 2020-06-11 PROCEDURE — 12000002 HC ACUTE/MED SURGE SEMI-PRIVATE ROOM

## 2020-06-11 PROCEDURE — 93010 EKG 12-LEAD: ICD-10-PCS | Mod: ,,, | Performed by: INTERNAL MEDICINE

## 2020-06-11 PROCEDURE — 25000003 PHARM REV CODE 250: Performed by: ANESTHESIOLOGY

## 2020-06-11 PROCEDURE — 25000003 PHARM REV CODE 250: Performed by: STUDENT IN AN ORGANIZED HEALTH CARE EDUCATION/TRAINING PROGRAM

## 2020-06-11 PROCEDURE — 86901 BLOOD TYPING SEROLOGIC RH(D): CPT

## 2020-06-11 PROCEDURE — 86920 COMPATIBILITY TEST SPIN: CPT

## 2020-06-11 PROCEDURE — 93005 ELECTROCARDIOGRAM TRACING: CPT

## 2020-06-11 PROCEDURE — 93010 ELECTROCARDIOGRAM REPORT: CPT | Mod: ,,, | Performed by: INTERNAL MEDICINE

## 2020-06-11 RX ORDER — SODIUM CHLORIDE 0.9 % (FLUSH) 0.9 %
3 SYRINGE (ML) INJECTION
Status: DISCONTINUED | OUTPATIENT
Start: 2020-06-11 | End: 2020-06-16 | Stop reason: HOSPADM

## 2020-06-11 RX ORDER — MUPIROCIN 20 MG/G
1 OINTMENT TOPICAL 2 TIMES DAILY
Status: DISCONTINUED | OUTPATIENT
Start: 2020-06-11 | End: 2020-06-12 | Stop reason: HOSPADM

## 2020-06-11 RX ORDER — SODIUM CHLORIDE 9 MG/ML
INJECTION, SOLUTION INTRAVENOUS CONTINUOUS
Status: DISCONTINUED | OUTPATIENT
Start: 2020-06-11 | End: 2020-06-16 | Stop reason: HOSPADM

## 2020-06-11 RX ADMIN — MUPIROCIN 1 G: 20 OINTMENT TOPICAL at 01:06

## 2020-06-11 RX ADMIN — SODIUM CHLORIDE: 0.9 INJECTION, SOLUTION INTRAVENOUS at 02:06

## 2020-06-11 NOTE — PROGRESS NOTES
Pt and family inquiring on status of sx and or rescheduling sx today. Dr. Ba, sx, notified of status of PT. Someone to come speak w/ Pt and family.   1625- Dr. Ba's resident at bedside talking to Pt and family. Pt to reschedule  for another day. Pt and family in agreement.   1700- Paged Dr. Ba's resident. Admit order needs to be cosigned in order to be DC.    1704- Per Molly, the PA, will have someone cosign as soon as possible.

## 2020-06-11 NOTE — PROGRESS NOTES
Notified Dr. Rivas, anesthesia, pt had soft mint at 0900. Per Dr. Rivas, may proceed with sx.   1345- Called for EKG to be done.   1350- EKG being done at door.   1355-Paged resident on call for Dr. Terry Ba, for clarification of sx consent. No update, site kirk or blood consent.  1358- Call back from Dr. Ba's service received. Notified PT in RM 17 in [facility]. Stated will take care of it.

## 2020-06-11 NOTE — CARE UPDATE
Patient seen in Pre-Op area, patient would like to go home and reschedule surgery at a later date. Patient's symptoms are unchanged from pre-op exam. Patient understands that if any new or worsening symptoms, he should call the neurosurgery clinic or go the ED immediately. Our office will be in contact to reschedule surgery. All questions answered, patient voiced understanding and agreement.     Armida Hutson PA-C

## 2020-06-11 NOTE — OP NOTE
DATE OF PROCEDURE:  6/11/2020     SURGEON:  Terry Ba M.D., Ph.D.     ASSISTANT:  DAISHA Kumari M.D.   (the assistant is a [facility] resident).     PREOPERATIVE DIAGNOSES:  1. 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  2. Lumbar spondylosis L2-L5.  3. Lumbar radiculopathy.  4. Lumbar stenosis.     POSTOPERATIVE DIAGNOSES:  1. 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  2. Lumbar spondylosis L2-L5.  3. Lumbar radiculopathy.  4. Lumbar stenosis.      PROCEDURES PERFORMED:  This patient elected not to have surgery today.  Per patient, it was getting late in the day and he did not want to proceed.

## 2020-06-12 ENCOUNTER — TELEPHONE (OUTPATIENT)
Dept: NEUROSURGERY | Facility: CLINIC | Age: 70
End: 2020-06-12

## 2020-06-12 DIAGNOSIS — M48.062 LUMBAR STENOSIS WITH NEUROGENIC CLAUDICATION: ICD-10-CM

## 2020-06-12 DIAGNOSIS — M47.26 OTHER SPONDYLOSIS WITH RADICULOPATHY, LUMBAR REGION: Primary | ICD-10-CM

## 2020-06-12 PROCEDURE — 12000002 HC ACUTE/MED SURGE SEMI-PRIVATE ROOM

## 2020-06-12 NOTE — TELEPHONE ENCOUNTER
----- Message from Yarely Marino sent at 6/12/2020  9:08 AM CDT -----  Contact: [phone]  Pt is calling to r/s his surgery. Please call back

## 2020-06-13 PROCEDURE — 12000002 HC ACUTE/MED SURGE SEMI-PRIVATE ROOM

## 2020-06-14 PROCEDURE — 12000002 HC ACUTE/MED SURGE SEMI-PRIVATE ROOM

## 2020-06-15 LAB
BLD PROD TYP BPU: NORMAL
BLD PROD TYP BPU: NORMAL
BLOOD UNIT EXPIRATION DATE: NORMAL
BLOOD UNIT EXPIRATION DATE: NORMAL
BLOOD UNIT TYPE CODE: 6200
BLOOD UNIT TYPE CODE: 6200
BLOOD UNIT TYPE: NORMAL
BLOOD UNIT TYPE: NORMAL
CODING SYSTEM: NORMAL
CODING SYSTEM: NORMAL
DISPENSE STATUS: NORMAL
DISPENSE STATUS: NORMAL
NUM UNITS TRANS PACKED RBC: NORMAL
NUM UNITS TRANS PACKED RBC: NORMAL

## 2020-06-15 PROCEDURE — 12000002 HC ACUTE/MED SURGE SEMI-PRIVATE ROOM

## 2020-06-19 ENCOUNTER — TELEPHONE (OUTPATIENT)
Dept: NEUROSURGERY | Facility: CLINIC | Age: 70
End: 2020-06-19

## 2020-06-19 DIAGNOSIS — M48.062 LUMBAR STENOSIS WITH NEUROGENIC CLAUDICATION: ICD-10-CM

## 2020-06-19 DIAGNOSIS — Z01.818 PRE-OP TESTING: Primary | ICD-10-CM

## 2020-06-21 ENCOUNTER — ANESTHESIA EVENT (OUTPATIENT)
Dept: SURGERY | Facility: HOSPITAL | Age: 70
DRG: 460 | End: 2020-06-21
Payer: COMMERCIAL

## 2020-06-21 NOTE — ANESTHESIA PREPROCEDURE EVALUATION
[facility]  Anesthesia Pre-Operative Evaluation         Patient Name: Kingsley Holbrook  YOB: 1950  MRN: [id number]    SUBJECTIVE:     Pre-operative evaluation for Procedure(s) (LRB):  FUSION, SPINE, LUMBAR, TLIF, MINIMALLY INVASIVE, @L2-5 (Left)     06/21/2020    Kingsley Holbrook is a 70 y.o. male w/ a significant PMHx of HTN, COPD, and b/l L2-L5 neuroforaminal stenosis with constant low back/LLL pain and paresthesias who presents for the above procedure.      LDA: None documented.    Prev airway: None documented.     Drips: None documented.    Patient Active Problem List   Diagnosis    Lumbar stenosis with neurogenic claudication    Facet arthropathy, lumbar    Degenerative disc disease, lumbar    Chronic left-sided low back pain without sciatica    Lumbar spondylosis    Tobacco abuse    Lumbar disc herniation    Prediabetes    Essential hypertension    COPD without exacerbation    Carotid atherosclerosis    Long term (current) use of antithrombotics/antiplatelets    NSAID long-term use    PVD (peripheral vascular disease)    Chest sounds abnormal on percussion or auscultation    Lumbar stenosis       Review of patient's allergies indicates:  No Known Allergies    Current Inpatient Medications:      No current facility-administered medications on file prior to encounter.      Current Outpatient Medications on File Prior to Encounter   Medication Sig Dispense Refill    albuterol (PROVENTIL) 2.5 mg /3 mL (0.083 %) nebulizer solution Inhale 2.5 mg into the lungs. Take if needed      amLODIPine (NORVASC) 5 MG tablet Take 1 tablet (5 mg total) by mouth once daily. 30 tablet 1    ascorbic acid, vitamin C, (VITAMIN C) 500 MG tablet Take 500 mg by mouth once daily.      budesonide-formoterol 160-4.5 mcg (SYMBICORT) 160-4.5 mcg/actuation HFAA Take if needed & bring      methocarbamoL (ROBAXIN) 750 MG Tab Take 500 mg by mouth 3 (three) times daily. Take if needed      TRELEGY  ELLIPTA 100-62.5-25 mcg DsDv Take 1 puff by mouth once daily. Take in am of surgery & bring         Past Surgical History:   Procedure Laterality Date    COLONOSCOPY         Social History     Socioeconomic History    Marital status: Single     Spouse name: Not on file    Number of children: Not on file    Years of education: Not on file    Highest education level: Not on file   Occupational History    Not on file   Social Needs    Financial resource strain: Not on file    Food insecurity     Worry: Not on file     Inability: Not on file    Transportation needs     Medical: Not on file     Non-medical: Not on file   Tobacco Use    Smoking status: Current Every Day Smoker    Smokeless tobacco: Never Used   Substance and Sexual Activity    Alcohol use: Yes     Comment: 1-2 drinks a week     Drug use: Yes     Types: Marijuana    Sexual activity: Not on file   Lifestyle    Physical activity     Days per week: Not on file     Minutes per session: Not on file    Stress: Not on file   Relationships    Social connections     Talks on phone: Not on file     Gets together: Not on file     Attends Yarsanism service: Not on file     Active member of club or organization: Not on file     Attends meetings of clubs or organizations: Not on file     Relationship status: Not on file   Other Topics Concern    Not on file   Social History Narrative    Not on file       OBJECTIVE:     Vital Signs Range (Last 24H):         CBC:   No results for input(s): WBC, RBC, HGB, HCT, PLT, MCV, MCH, MCHC in the last 72 hours.    CMP: No results for input(s): NA, K, CL, CO2, BUN, CREATININE, GLU, MG, PHOS, CALCIUM, ALBUMIN, PROT, ALKPHOS, ALT, AST, BILITOT in the last 72 hours.    INR:  No results for input(s): PT, INR, PROTIME, APTT in the last 72 hours.    Diagnostic Studies: No relevant studies.    EKG: No recent studies available.    2D ECHO:  No results found for this or any previous visit.      ASSESSMENT/PLAN:          Pre-op Assessment    I have reviewed the Patient Summary Reports.       I have reviewed the Medications.     Review of Systems  Anesthesia Hx:  No problems with previous Anesthesia  History of prior surgery of interest to airway management or planning: Previous anesthesia: General Denies Family Hx of Anesthesia complications.    Social:  Smoker, Social Alcohol Use Marijuana   Hematology/Oncology:  Hematology Normal   Oncology Normal     EENT/Dental:EENT/Dental Normal   Cardiovascular:   Exercise tolerance: poor Hypertension, well controlled  Denies Angina.  Functional Capacity good / => 4 METS  Peripheral Arterial Disease  Hypertension , Fairly Controlled on RX , Recent typical clinic B/P of 176/80    Pulmonary:   COPD, moderate Denies Shortness of breath.  Denies Recent URI.  Chronic Obstructive Pulmonary Disease (COPD):  is secondary to smoking. Inhaler use is maintenance inhaler daily and rescue inhaler PRN.  Possible Obstructive Sleep Apnea , (STOP/BANG) Symptoms S - Snoring (loud), A - Age > 50 and G - Gender (Male > Female)        Renal/:  Renal/ Normal     Hepatic/GI:  Hepatic/GI Normal    Musculoskeletal:  Musculoskeletal General/Symptoms: low back pain. Functional capacity is ambulatory with cane.  Spine Disorders: Spinal Stenosis, Lumbar Spinal Stenosis Lumbar Spine Disorders, Lumbar Disc Disease   Neurological:  Neurology Normal  Pain , onset is chronic , location of back pain , severity is a 8 , alleviating factors are prn advil. Nerve Injury C/o right LE paresthesias (non-operative side)   Endocrine:  Endocrine Normal    Dermatological:  Skin Normal    Psych:  Psychiatric Normal           Physical Exam  General:  Well nourished    Airway/Jaw/Neck:  Airway Findings: Mouth Opening: Normal Tongue: Normal  General Airway Assessment: Adult  Mallampati: II  TM Distance: Normal, at least 6 cm  Jaw/Neck Findings:  Neck ROM: Normal ROM      Dental:  Dental Findings: In tact    Chest/Lungs:  Chest/Lungs Findings: Clear to auscultation         Mental Status:  Mental Status Findings:  Cooperative, Alert and Oriented         Anesthesia Plan  Type of Anesthesia, risks & benefits discussed:  Anesthesia Type:  general  Patient's Preference: GA  Intra-op Monitoring Plan: standard ASA monitors  Intra-op Monitoring Plan Comments:   Post Op Pain Control Plan: multimodal analgesia  Post Op Pain Control Plan Comments:   Induction:   IV  Beta Blocker:  Patient is not currently on a Beta-Blocker (No further documentation required).       Informed Consent: Patient understands risks and agrees with Anesthesia plan.  Questions answered. Anesthesia consent signed with patient.  ASA Score: 3     Day of Surgery Review of History & Physical:  There are no significant changes.  H&P update referred to the surgeon.         Ready For Surgery From Anesthesia Perspective.

## 2020-06-22 ENCOUNTER — HOSPITAL ENCOUNTER (INPATIENT)
Facility: HOSPITAL | Age: 70
LOS: 2 days | Discharge: HOME HEALTH | DRG: 460 | End: 2020-06-24
Attending: NEUROLOGICAL SURGERY | Admitting: NEUROLOGICAL SURGERY
Payer: COMMERCIAL

## 2020-06-22 ENCOUNTER — ANESTHESIA (OUTPATIENT)
Dept: SURGERY | Facility: HOSPITAL | Age: 70
DRG: 460 | End: 2020-06-22
Payer: COMMERCIAL

## 2020-06-22 DIAGNOSIS — M48.061 LUMBAR STENOSIS: ICD-10-CM

## 2020-06-22 DIAGNOSIS — M48.062 LUMBAR STENOSIS WITH NEUROGENIC CLAUDICATION: Primary | ICD-10-CM

## 2020-06-22 LAB
ABO + RH BLD: NORMAL
ANION GAP SERPL CALC-SCNC: 12 MMOL/L (ref 8–16)
APTT BLDCRRT: 26.9 SEC (ref 21–32)
BASOPHILS # BLD AUTO: 0.04 K/UL (ref 0–0.2)
BASOPHILS NFR BLD: 0.6 % (ref 0–1.9)
BLD GP AB SCN CELLS X3 SERPL QL: NORMAL
BUN SERPL-MCNC: 8 MG/DL (ref 8–23)
CALCIUM SERPL-MCNC: 9.2 MG/DL (ref 8.7–10.5)
CHLORIDE SERPL-SCNC: 105 MMOL/L (ref 95–110)
CO2 SERPL-SCNC: 23 MMOL/L (ref 23–29)
CREAT SERPL-MCNC: 1 MG/DL (ref 0.5–1.4)
DIFFERENTIAL METHOD: ABNORMAL
EOSINOPHIL # BLD AUTO: 0.4 K/UL (ref 0–0.5)
EOSINOPHIL NFR BLD: 6.2 % (ref 0–8)
ERYTHROCYTE [DISTWIDTH] IN BLOOD BY AUTOMATED COUNT: 13.8 % (ref 11.5–14.5)
EST. GFR  (AFRICAN AMERICAN): >60 ML/MIN/1.73 M^2
EST. GFR  (NON AFRICAN AMERICAN): >60 ML/MIN/1.73 M^2
GLUCOSE SERPL-MCNC: 125 MG/DL (ref 70–110)
HCT VFR BLD AUTO: 43.7 % (ref 40–54)
HGB BLD-MCNC: 14 G/DL (ref 14–18)
IMM GRANULOCYTES # BLD AUTO: 0.06 K/UL (ref 0–0.04)
IMM GRANULOCYTES NFR BLD AUTO: 0.9 % (ref 0–0.5)
INR PPP: 1 (ref 0.8–1.2)
LYMPHOCYTES # BLD AUTO: 1.4 K/UL (ref 1–4.8)
LYMPHOCYTES NFR BLD: 19.4 % (ref 18–48)
MCH RBC QN AUTO: 29.8 PG (ref 27–31)
MCHC RBC AUTO-ENTMCNC: 32 G/DL (ref 32–36)
MCV RBC AUTO: 93 FL (ref 82–98)
MONOCYTES # BLD AUTO: 0.8 K/UL (ref 0.3–1)
MONOCYTES NFR BLD: 11.1 % (ref 4–15)
NEUTROPHILS # BLD AUTO: 4.4 K/UL (ref 1.8–7.7)
NEUTROPHILS NFR BLD: 61.8 % (ref 38–73)
NRBC BLD-RTO: 0 /100 WBC
PLATELET # BLD AUTO: 239 K/UL (ref 150–350)
PMV BLD AUTO: 9.8 FL (ref 9.2–12.9)
POTASSIUM SERPL-SCNC: 4 MMOL/L (ref 3.5–5.1)
PROTHROMBIN TIME: 10.2 SEC (ref 9–12.5)
RBC # BLD AUTO: 4.7 M/UL (ref 4.6–6.2)
SODIUM SERPL-SCNC: 140 MMOL/L (ref 136–145)
WBC # BLD AUTO: 7.05 K/UL (ref 3.9–12.7)

## 2020-06-22 PROCEDURE — 25000003 PHARM REV CODE 250: Performed by: STUDENT IN AN ORGANIZED HEALTH CARE EDUCATION/TRAINING PROGRAM

## 2020-06-22 PROCEDURE — 11000001 HC ACUTE MED/SURG PRIVATE ROOM

## 2020-06-22 PROCEDURE — 63600175 PHARM REV CODE 636 W HCPCS: Performed by: NEUROLOGICAL SURGERY

## 2020-06-22 PROCEDURE — 71000015 HC POSTOP RECOV 1ST HR: Performed by: NEUROLOGICAL SURGERY

## 2020-06-22 PROCEDURE — 36000710: Performed by: NEUROLOGICAL SURGERY

## 2020-06-22 PROCEDURE — 86850 RBC ANTIBODY SCREEN: CPT

## 2020-06-22 PROCEDURE — 37000009 HC ANESTHESIA EA ADD 15 MINS: Performed by: NEUROLOGICAL SURGERY

## 2020-06-22 PROCEDURE — 63600175 PHARM REV CODE 636 W HCPCS: Performed by: STUDENT IN AN ORGANIZED HEALTH CARE EDUCATION/TRAINING PROGRAM

## 2020-06-22 PROCEDURE — 99900035 HC TECH TIME PER 15 MIN (STAT)

## 2020-06-22 PROCEDURE — 27800903 OPTIME MED/SURG SUP & DEVICES OTHER IMPLANTS: Performed by: NEUROLOGICAL SURGERY

## 2020-06-22 PROCEDURE — 20936 PR AUTOGRAFT SPINE SURGERY LOCAL FROM SAME INCISION: ICD-10-PCS | Mod: ,,, | Performed by: NEUROLOGICAL SURGERY

## 2020-06-22 PROCEDURE — 22632 PR ARTHRODESIS POSTERIOR INTERBODY EA ADDL: ICD-10-PCS | Mod: ,,, | Performed by: NEUROLOGICAL SURGERY

## 2020-06-22 PROCEDURE — 37000008 HC ANESTHESIA 1ST 15 MINUTES: Performed by: NEUROLOGICAL SURGERY

## 2020-06-22 PROCEDURE — 22853 PR INSERT BIOMECH DEV W/INTERBODY ARTHRODESIS, EA CONTIGUOUS DEFECT: ICD-10-PCS | Mod: ,,, | Performed by: NEUROLOGICAL SURGERY

## 2020-06-22 PROCEDURE — 71000016 HC POSTOP RECOV ADDL HR: Performed by: NEUROLOGICAL SURGERY

## 2020-06-22 PROCEDURE — 22842 INSERT SPINE FIXATION DEVICE: CPT | Mod: ,,, | Performed by: NEUROLOGICAL SURGERY

## 2020-06-22 PROCEDURE — C1769 GUIDE WIRE: HCPCS | Performed by: NEUROLOGICAL SURGERY

## 2020-06-22 PROCEDURE — 85025 COMPLETE CBC W/AUTO DIFF WBC: CPT

## 2020-06-22 PROCEDURE — 20936 SP BONE AGRFT LOCAL ADD-ON: CPT | Mod: ,,, | Performed by: NEUROLOGICAL SURGERY

## 2020-06-22 PROCEDURE — 71000033 HC RECOVERY, INTIAL HOUR: Performed by: NEUROLOGICAL SURGERY

## 2020-06-22 PROCEDURE — 94640 AIRWAY INHALATION TREATMENT: CPT

## 2020-06-22 PROCEDURE — C1751 CATH, INF, PER/CENT/MIDLINE: HCPCS | Performed by: ANESTHESIOLOGY

## 2020-06-22 PROCEDURE — 36620 PR INSERT CATH,ART,PERCUT,SHORTTERM: ICD-10-PCS | Mod: 59,,, | Performed by: ANESTHESIOLOGY

## 2020-06-22 PROCEDURE — 22853 INSJ BIOMECHANICAL DEVICE: CPT | Mod: ,,, | Performed by: NEUROLOGICAL SURGERY

## 2020-06-22 PROCEDURE — 85730 THROMBOPLASTIN TIME PARTIAL: CPT

## 2020-06-22 PROCEDURE — 94761 N-INVAS EAR/PLS OXIMETRY MLT: CPT

## 2020-06-22 PROCEDURE — 25000242 PHARM REV CODE 250 ALT 637 W/ HCPCS: Performed by: STUDENT IN AN ORGANIZED HEALTH CARE EDUCATION/TRAINING PROGRAM

## 2020-06-22 PROCEDURE — 20930 PR ALLOGRAFT FOR SPINE SURGERY ONLY MORSELIZED: ICD-10-PCS | Mod: ,,, | Performed by: NEUROLOGICAL SURGERY

## 2020-06-22 PROCEDURE — D9220A PRA ANESTHESIA: ICD-10-PCS | Mod: ,,, | Performed by: ANESTHESIOLOGY

## 2020-06-22 PROCEDURE — 94799 UNLISTED PULMONARY SVC/PX: CPT

## 2020-06-22 PROCEDURE — 22842 PR POSTERIOR SEGMENTAL INSTRUMENTATION 3-6 VRT SEG: ICD-10-PCS | Mod: ,,, | Performed by: NEUROLOGICAL SURGERY

## 2020-06-22 PROCEDURE — 27201423 OPTIME MED/SURG SUP & DEVICES STERILE SUPPLY: Performed by: NEUROLOGICAL SURGERY

## 2020-06-22 PROCEDURE — 85610 PROTHROMBIN TIME: CPT

## 2020-06-22 PROCEDURE — 22630 PR ARTHRODESIS POSTERIOR INTERBODY LUMBAR: ICD-10-PCS | Mod: ,,, | Performed by: NEUROLOGICAL SURGERY

## 2020-06-22 PROCEDURE — 22630 ARTHRD PST TQ 1NTRSPC LUM: CPT | Mod: ,,, | Performed by: NEUROLOGICAL SURGERY

## 2020-06-22 PROCEDURE — 80048 BASIC METABOLIC PNL TOTAL CA: CPT

## 2020-06-22 PROCEDURE — D9220A PRA ANESTHESIA: Mod: ,,, | Performed by: ANESTHESIOLOGY

## 2020-06-22 PROCEDURE — 22632 ARTHRD PST TQ 1NTRSPC LM EA: CPT | Mod: ,,, | Performed by: NEUROLOGICAL SURGERY

## 2020-06-22 PROCEDURE — 25000003 PHARM REV CODE 250: Performed by: NEUROLOGICAL SURGERY

## 2020-06-22 PROCEDURE — 36620 INSERTION CATHETER ARTERY: CPT | Mod: 59,,, | Performed by: ANESTHESIOLOGY

## 2020-06-22 PROCEDURE — 27200677 HC TRANSDUCER MONITOR KIT SINGLE: Performed by: ANESTHESIOLOGY

## 2020-06-22 PROCEDURE — 71000039 HC RECOVERY, EACH ADD'L HOUR: Performed by: NEUROLOGICAL SURGERY

## 2020-06-22 PROCEDURE — C1713 ANCHOR/SCREW BN/BN,TIS/BN: HCPCS | Performed by: NEUROLOGICAL SURGERY

## 2020-06-22 PROCEDURE — 20930 SP BONE ALGRFT MORSEL ADD-ON: CPT | Mod: ,,, | Performed by: NEUROLOGICAL SURGERY

## 2020-06-22 PROCEDURE — 36000711: Performed by: NEUROLOGICAL SURGERY

## 2020-06-22 DEVICE — GWIRE SPINAL BLNT TIP 1.6X19: Type: IMPLANTABLE DEVICE | Site: SPINE LUMBAR | Status: FUNCTIONAL

## 2020-06-22 DEVICE — IMPLANTABLE DEVICE: Type: IMPLANTABLE DEVICE | Site: SPINE LUMBAR | Status: FUNCTIONAL

## 2020-06-22 DEVICE — CAGE LEVA 11MM 25X10X10: Type: IMPLANTABLE DEVICE | Site: SPINE LUMBAR | Status: FUNCTIONAL

## 2020-06-22 DEVICE — KIT MED BONE INFUSE: Type: IMPLANTABLE DEVICE | Site: SPINE LUMBAR | Status: FUNCTIONAL

## 2020-06-22 DEVICE — CAGE LEVA 10MM 25X10X10: Type: IMPLANTABLE DEVICE | Site: SPINE LUMBAR | Status: FUNCTIONAL

## 2020-06-22 RX ORDER — HYDROMORPHONE HYDROCHLORIDE 1 MG/ML
2 INJECTION, SOLUTION INTRAMUSCULAR; INTRAVENOUS; SUBCUTANEOUS
Status: DISCONTINUED | OUTPATIENT
Start: 2020-06-22 | End: 2020-06-23

## 2020-06-22 RX ORDER — SODIUM CHLORIDE 0.9 % (FLUSH) 0.9 %
10 SYRINGE (ML) INJECTION
Status: DISCONTINUED | OUTPATIENT
Start: 2020-06-22 | End: 2020-06-22 | Stop reason: HOSPADM

## 2020-06-22 RX ORDER — MUPIROCIN 20 MG/G
1 OINTMENT TOPICAL 2 TIMES DAILY
Status: DISCONTINUED | OUTPATIENT
Start: 2020-06-22 | End: 2020-06-22 | Stop reason: HOSPADM

## 2020-06-22 RX ORDER — CEFAZOLIN SODIUM 1 G/3ML
2 INJECTION, POWDER, FOR SOLUTION INTRAMUSCULAR; INTRAVENOUS
Status: COMPLETED | OUTPATIENT
Start: 2020-06-22 | End: 2020-06-22

## 2020-06-22 RX ORDER — KETAMINE HCL IN 0.9 % NACL 50 MG/5 ML
SYRINGE (ML) INTRAVENOUS
Status: DISCONTINUED | OUTPATIENT
Start: 2020-06-22 | End: 2020-06-22

## 2020-06-22 RX ORDER — VANCOMYCIN HYDROCHLORIDE 1 G/20ML
INJECTION, POWDER, LYOPHILIZED, FOR SOLUTION INTRAVENOUS
Status: DISCONTINUED | OUTPATIENT
Start: 2020-06-22 | End: 2020-06-22 | Stop reason: HOSPADM

## 2020-06-22 RX ORDER — PROPOFOL 10 MG/ML
VIAL (ML) INTRAVENOUS
Status: DISCONTINUED | OUTPATIENT
Start: 2020-06-22 | End: 2020-06-22

## 2020-06-22 RX ORDER — SODIUM CHLORIDE 9 MG/ML
INJECTION, SOLUTION INTRAVENOUS CONTINUOUS
Status: DISCONTINUED | OUTPATIENT
Start: 2020-06-22 | End: 2020-06-23

## 2020-06-22 RX ORDER — HYDROCODONE BITARTRATE AND ACETAMINOPHEN 10; 325 MG/1; MG/1
1 TABLET ORAL EVERY 4 HOURS PRN
Status: DISCONTINUED | OUTPATIENT
Start: 2020-06-22 | End: 2020-06-23

## 2020-06-22 RX ORDER — REMIFENTANIL HYDROCHLORIDE 1 MG/ML
INJECTION, POWDER, LYOPHILIZED, FOR SOLUTION INTRAVENOUS CONTINUOUS PRN
Status: DISCONTINUED | OUTPATIENT
Start: 2020-06-22 | End: 2020-06-22

## 2020-06-22 RX ORDER — ALBUTEROL SULFATE 2.5 MG/.5ML
2.5 SOLUTION RESPIRATORY (INHALATION) EVERY 4 HOURS PRN
Status: DISCONTINUED | OUTPATIENT
Start: 2020-06-22 | End: 2020-06-25 | Stop reason: HOSPADM

## 2020-06-22 RX ORDER — AMOXICILLIN 250 MG
2 CAPSULE ORAL NIGHTLY PRN
Status: DISCONTINUED | OUTPATIENT
Start: 2020-06-22 | End: 2020-06-23

## 2020-06-22 RX ORDER — ACETAMINOPHEN 325 MG/1
650 TABLET ORAL EVERY 6 HOURS PRN
Status: DISCONTINUED | OUTPATIENT
Start: 2020-06-22 | End: 2020-06-25 | Stop reason: HOSPADM

## 2020-06-22 RX ORDER — PHENYLEPHRINE HYDROCHLORIDE 10 MG/ML
INJECTION INTRAVENOUS
Status: DISCONTINUED | OUTPATIENT
Start: 2020-06-22 | End: 2020-06-22

## 2020-06-22 RX ORDER — ONDANSETRON 2 MG/ML
INJECTION INTRAMUSCULAR; INTRAVENOUS
Status: DISCONTINUED | OUTPATIENT
Start: 2020-06-22 | End: 2020-06-22

## 2020-06-22 RX ORDER — FAMOTIDINE 20 MG/1
20 TABLET, FILM COATED ORAL 2 TIMES DAILY
Status: DISCONTINUED | OUTPATIENT
Start: 2020-06-22 | End: 2020-06-25 | Stop reason: HOSPADM

## 2020-06-22 RX ORDER — SUCCINYLCHOLINE CHLORIDE 20 MG/ML
INJECTION INTRAMUSCULAR; INTRAVENOUS
Status: DISCONTINUED | OUTPATIENT
Start: 2020-06-22 | End: 2020-06-22

## 2020-06-22 RX ORDER — ALBUTEROL SULFATE 2.5 MG/.5ML
2.5 SOLUTION RESPIRATORY (INHALATION) EVERY 4 HOURS
Status: DISCONTINUED | OUTPATIENT
Start: 2020-06-22 | End: 2020-06-22

## 2020-06-22 RX ORDER — TIOTROPIUM BROMIDE 18 UG/1
1 CAPSULE ORAL; RESPIRATORY (INHALATION) DAILY
Status: DISCONTINUED | OUTPATIENT
Start: 2020-06-23 | End: 2020-06-25 | Stop reason: HOSPADM

## 2020-06-22 RX ORDER — BUDESONIDE AND FORMOTEROL FUMARATE DIHYDRATE 160; 4.5 UG/1; UG/1
2 AEROSOL RESPIRATORY (INHALATION) EVERY 12 HOURS
Status: DISCONTINUED | OUTPATIENT
Start: 2020-06-22 | End: 2020-06-22

## 2020-06-22 RX ORDER — MUPIROCIN 20 MG/G
OINTMENT TOPICAL 2 TIMES DAILY
Status: DISCONTINUED | OUTPATIENT
Start: 2020-06-22 | End: 2020-06-25 | Stop reason: HOSPADM

## 2020-06-22 RX ORDER — SODIUM CHLORIDE 9 MG/ML
INJECTION, SOLUTION INTRAVENOUS CONTINUOUS
Status: DISCONTINUED | OUTPATIENT
Start: 2020-06-22 | End: 2020-06-22

## 2020-06-22 RX ORDER — HEPARIN SODIUM 5000 [USP'U]/ML
5000 INJECTION, SOLUTION INTRAVENOUS; SUBCUTANEOUS EVERY 8 HOURS
Status: DISCONTINUED | OUTPATIENT
Start: 2020-06-22 | End: 2020-06-25 | Stop reason: HOSPADM

## 2020-06-22 RX ORDER — LABETALOL HYDROCHLORIDE 5 MG/ML
10 INJECTION, SOLUTION INTRAVENOUS ONCE
Status: COMPLETED | OUTPATIENT
Start: 2020-06-22 | End: 2020-06-22

## 2020-06-22 RX ORDER — ALBUTEROL SULFATE 90 UG/1
AEROSOL, METERED RESPIRATORY (INHALATION)
Status: DISCONTINUED | OUTPATIENT
Start: 2020-06-22 | End: 2020-06-22

## 2020-06-22 RX ORDER — DEXAMETHASONE SODIUM PHOSPHATE 4 MG/ML
INJECTION, SOLUTION INTRA-ARTICULAR; INTRALESIONAL; INTRAMUSCULAR; INTRAVENOUS; SOFT TISSUE
Status: DISCONTINUED | OUTPATIENT
Start: 2020-06-22 | End: 2020-06-22

## 2020-06-22 RX ORDER — BACITRACIN 50000 [IU]/1
INJECTION, POWDER, FOR SOLUTION INTRAMUSCULAR
Status: DISCONTINUED | OUTPATIENT
Start: 2020-06-22 | End: 2020-06-22 | Stop reason: HOSPADM

## 2020-06-22 RX ORDER — PROPOFOL 10 MG/ML
VIAL (ML) INTRAVENOUS CONTINUOUS PRN
Status: DISCONTINUED | OUTPATIENT
Start: 2020-06-22 | End: 2020-06-22

## 2020-06-22 RX ORDER — METHYLPREDNISOLONE ACETATE 40 MG/ML
INJECTION, SUSPENSION INTRA-ARTICULAR; INTRALESIONAL; INTRAMUSCULAR; SOFT TISSUE
Status: DISCONTINUED | OUTPATIENT
Start: 2020-06-22 | End: 2020-06-22 | Stop reason: HOSPADM

## 2020-06-22 RX ORDER — BISACODYL 10 MG
10 SUPPOSITORY, RECTAL RECTAL DAILY
Status: DISCONTINUED | OUTPATIENT
Start: 2020-06-22 | End: 2020-06-23

## 2020-06-22 RX ORDER — CEFAZOLIN SODIUM 1 G/3ML
2 INJECTION, POWDER, FOR SOLUTION INTRAMUSCULAR; INTRAVENOUS
Status: COMPLETED | OUTPATIENT
Start: 2020-06-22 | End: 2020-06-23

## 2020-06-22 RX ORDER — FENTANYL CITRATE 50 UG/ML
INJECTION, SOLUTION INTRAMUSCULAR; INTRAVENOUS
Status: DISCONTINUED | OUTPATIENT
Start: 2020-06-22 | End: 2020-06-22

## 2020-06-22 RX ORDER — HYDROMORPHONE HYDROCHLORIDE 1 MG/ML
0.2 INJECTION, SOLUTION INTRAMUSCULAR; INTRAVENOUS; SUBCUTANEOUS EVERY 5 MIN PRN
Status: COMPLETED | OUTPATIENT
Start: 2020-06-22 | End: 2020-06-22

## 2020-06-22 RX ORDER — IPRATROPIUM BROMIDE AND ALBUTEROL SULFATE 2.5; .5 MG/3ML; MG/3ML
3 SOLUTION RESPIRATORY (INHALATION) ONCE
Status: COMPLETED | OUTPATIENT
Start: 2020-06-22 | End: 2020-06-22

## 2020-06-22 RX ORDER — LIDOCAINE HYDROCHLORIDE 20 MG/ML
INJECTION INTRAVENOUS
Status: DISCONTINUED | OUTPATIENT
Start: 2020-06-22 | End: 2020-06-22

## 2020-06-22 RX ORDER — AMOXICILLIN 250 MG
1 CAPSULE ORAL DAILY
Status: DISCONTINUED | OUTPATIENT
Start: 2020-06-22 | End: 2020-06-25 | Stop reason: HOSPADM

## 2020-06-22 RX ORDER — LABETALOL HCL 20 MG/4 ML
10 SYRINGE (ML) INTRAVENOUS
Status: DISCONTINUED | OUTPATIENT
Start: 2020-06-22 | End: 2020-06-25 | Stop reason: HOSPADM

## 2020-06-22 RX ORDER — ASPIRIN 81 MG/1
81 TABLET ORAL DAILY
Status: ON HOLD | COMMUNITY
End: 2020-06-24 | Stop reason: HOSPADM

## 2020-06-22 RX ORDER — LABETALOL HYDROCHLORIDE 5 MG/ML
INJECTION, SOLUTION INTRAVENOUS
Status: DISCONTINUED | OUTPATIENT
Start: 2020-06-22 | End: 2020-06-22

## 2020-06-22 RX ORDER — LABETALOL HCL 20 MG/4 ML
SYRINGE (ML) INTRAVENOUS
Status: DISPENSED
Start: 2020-06-22 | End: 2020-06-23

## 2020-06-22 RX ORDER — ONDANSETRON 8 MG/1
8 TABLET, ORALLY DISINTEGRATING ORAL EVERY 6 HOURS PRN
Status: DISCONTINUED | OUTPATIENT
Start: 2020-06-22 | End: 2020-06-25 | Stop reason: HOSPADM

## 2020-06-22 RX ORDER — METHOCARBAMOL 500 MG/1
1000 TABLET, FILM COATED ORAL 4 TIMES DAILY
Status: DISCONTINUED | OUTPATIENT
Start: 2020-06-22 | End: 2020-06-25 | Stop reason: HOSPADM

## 2020-06-22 RX ORDER — BUPIVACAINE HYDROCHLORIDE AND EPINEPHRINE 5; 5 MG/ML; UG/ML
INJECTION, SOLUTION EPIDURAL; INTRACAUDAL; PERINEURAL
Status: DISCONTINUED | OUTPATIENT
Start: 2020-06-22 | End: 2020-06-22 | Stop reason: HOSPADM

## 2020-06-22 RX ORDER — FLUTICASONE FUROATE AND VILANTEROL 100; 25 UG/1; UG/1
1 POWDER RESPIRATORY (INHALATION) DAILY
Status: DISCONTINUED | OUTPATIENT
Start: 2020-06-23 | End: 2020-06-25 | Stop reason: HOSPADM

## 2020-06-22 RX ORDER — AMLODIPINE BESYLATE 5 MG/1
5 TABLET ORAL DAILY
Status: DISCONTINUED | OUTPATIENT
Start: 2020-06-22 | End: 2020-06-25 | Stop reason: HOSPADM

## 2020-06-22 RX ORDER — FENTANYL CITRATE 50 UG/ML
25 INJECTION, SOLUTION INTRAMUSCULAR; INTRAVENOUS EVERY 5 MIN PRN
Status: COMPLETED | OUTPATIENT
Start: 2020-06-22 | End: 2020-06-22

## 2020-06-22 RX ORDER — MAG HYDROX/ALUMINUM HYD/SIMETH 200-200-20
30 SUSPENSION, ORAL (FINAL DOSE FORM) ORAL EVERY 4 HOURS PRN
Status: DISCONTINUED | OUTPATIENT
Start: 2020-06-22 | End: 2020-06-25 | Stop reason: HOSPADM

## 2020-06-22 RX ORDER — IPRATROPIUM BROMIDE AND ALBUTEROL SULFATE 2.5; .5 MG/3ML; MG/3ML
3 SOLUTION RESPIRATORY (INHALATION)
Status: DISCONTINUED | OUTPATIENT
Start: 2020-06-22 | End: 2020-06-22

## 2020-06-22 RX ORDER — MUPIROCIN 20 MG/G
OINTMENT TOPICAL
Status: DISCONTINUED | OUTPATIENT
Start: 2020-06-22 | End: 2020-06-22 | Stop reason: HOSPADM

## 2020-06-22 RX ORDER — ROCURONIUM BROMIDE 10 MG/ML
INJECTION, SOLUTION INTRAVENOUS
Status: DISCONTINUED | OUTPATIENT
Start: 2020-06-22 | End: 2020-06-22

## 2020-06-22 RX ORDER — ESMOLOL HYDROCHLORIDE 10 MG/ML
INJECTION INTRAVENOUS
Status: DISCONTINUED | OUTPATIENT
Start: 2020-06-22 | End: 2020-06-22

## 2020-06-22 RX ADMIN — SODIUM CHLORIDE: 0.9 INJECTION, SOLUTION INTRAVENOUS at 10:06

## 2020-06-22 RX ADMIN — FENTANYL CITRATE 50 MCG: 50 INJECTION, SOLUTION INTRAMUSCULAR; INTRAVENOUS at 07:06

## 2020-06-22 RX ADMIN — FENTANYL CITRATE 25 MCG: 50 INJECTION INTRAMUSCULAR; INTRAVENOUS at 03:06

## 2020-06-22 RX ADMIN — METHOCARBAMOL TABLETS 1000 MG: 500 TABLET, COATED ORAL at 01:06

## 2020-06-22 RX ADMIN — HYDROMORPHONE HYDROCHLORIDE 0.2 MG: 1 INJECTION, SOLUTION INTRAMUSCULAR; INTRAVENOUS; SUBCUTANEOUS at 02:06

## 2020-06-22 RX ADMIN — PROPOFOL 20 MG: 10 INJECTION, EMULSION INTRAVENOUS at 12:06

## 2020-06-22 RX ADMIN — ALBUTEROL SULFATE 2 PUFF: 90 AEROSOL, METERED RESPIRATORY (INHALATION) at 12:06

## 2020-06-22 RX ADMIN — SODIUM CHLORIDE, SODIUM GLUCONATE, SODIUM ACETATE, POTASSIUM CHLORIDE, MAGNESIUM CHLORIDE, SODIUM PHOSPHATE, DIBASIC, AND POTASSIUM PHOSPHATE: .53; .5; .37; .037; .03; .012; .00082 INJECTION, SOLUTION INTRAVENOUS at 09:06

## 2020-06-22 RX ADMIN — AMLODIPINE BESYLATE 5 MG: 5 TABLET ORAL at 01:06

## 2020-06-22 RX ADMIN — CEFAZOLIN 2 G: 330 INJECTION, POWDER, FOR SOLUTION INTRAMUSCULAR; INTRAVENOUS at 11:06

## 2020-06-22 RX ADMIN — Medication 10 MG: at 09:06

## 2020-06-22 RX ADMIN — LABETALOL HYDROCHLORIDE 10 MG: 5 INJECTION, SOLUTION INTRAVENOUS at 01:06

## 2020-06-22 RX ADMIN — DOCUSATE SODIUM 50 MG AND SENNOSIDES 8.6 MG 1 TABLET: 8.6; 5 TABLET, FILM COATED ORAL at 01:06

## 2020-06-22 RX ADMIN — HYDROMORPHONE HYDROCHLORIDE 0.2 MG: 1 INJECTION, SOLUTION INTRAMUSCULAR; INTRAVENOUS; SUBCUTANEOUS at 01:06

## 2020-06-22 RX ADMIN — CEFAZOLIN 2 G: 330 INJECTION, POWDER, FOR SOLUTION INTRAMUSCULAR; INTRAVENOUS at 07:06

## 2020-06-22 RX ADMIN — PHENYLEPHRINE HYDROCHLORIDE 100 MCG: 10 INJECTION INTRAVENOUS at 07:06

## 2020-06-22 RX ADMIN — ESMOLOL HYDROCHLORIDE 10 MG: 10 INJECTION INTRAVENOUS at 12:06

## 2020-06-22 RX ADMIN — SUCCINYLCHOLINE CHLORIDE 140 MG: 20 INJECTION, SOLUTION INTRAMUSCULAR; INTRAVENOUS at 07:06

## 2020-06-22 RX ADMIN — HYDROCODONE BITARTRATE AND ACETAMINOPHEN 1 TABLET: 10; 325 TABLET ORAL at 06:06

## 2020-06-22 RX ADMIN — PROPOFOL 200 MCG/KG/MIN: 10 INJECTION, EMULSION INTRAVENOUS at 07:06

## 2020-06-22 RX ADMIN — ESMOLOL HYDROCHLORIDE 10 MG: 10 INJECTION INTRAVENOUS at 01:06

## 2020-06-22 RX ADMIN — PHENYLEPHRINE HYDROCHLORIDE 100 MCG: 10 INJECTION INTRAVENOUS at 09:06

## 2020-06-22 RX ADMIN — SODIUM CHLORIDE, SODIUM GLUCONATE, SODIUM ACETATE, POTASSIUM CHLORIDE, MAGNESIUM CHLORIDE, SODIUM PHOSPHATE, DIBASIC, AND POTASSIUM PHOSPHATE: .53; .5; .37; .037; .03; .012; .00082 INJECTION, SOLUTION INTRAVENOUS at 07:06

## 2020-06-22 RX ADMIN — PHENYLEPHRINE HYDROCHLORIDE 100 MCG: 10 INJECTION INTRAVENOUS at 08:06

## 2020-06-22 RX ADMIN — PHENYLEPHRINE HYDROCHLORIDE 100 MCG: 10 INJECTION INTRAVENOUS at 10:06

## 2020-06-22 RX ADMIN — MUPIROCIN: 20 OINTMENT TOPICAL at 06:06

## 2020-06-22 RX ADMIN — HEPARIN SODIUM 5000 UNITS: 5000 INJECTION INTRAVENOUS; SUBCUTANEOUS at 01:06

## 2020-06-22 RX ADMIN — Medication 10 MG: at 10:06

## 2020-06-22 RX ADMIN — HYDROCODONE BITARTRATE AND ACETAMINOPHEN 1 TABLET: 10; 325 TABLET ORAL at 01:06

## 2020-06-22 RX ADMIN — ROCURONIUM BROMIDE 10 MG: 10 INJECTION, SOLUTION INTRAVENOUS at 07:06

## 2020-06-22 RX ADMIN — SODIUM CHLORIDE: 0.9 INJECTION, SOLUTION INTRAVENOUS at 06:06

## 2020-06-22 RX ADMIN — PHENYLEPHRINE HYDROCHLORIDE 100 MCG: 10 INJECTION INTRAVENOUS at 12:06

## 2020-06-22 RX ADMIN — DEXAMETHASONE SODIUM PHOSPHATE 4 MG: 4 INJECTION, SOLUTION INTRAMUSCULAR; INTRAVENOUS at 07:06

## 2020-06-22 RX ADMIN — METHOCARBAMOL TABLETS 1000 MG: 500 TABLET, COATED ORAL at 04:06

## 2020-06-22 RX ADMIN — HEPARIN SODIUM 5000 UNITS: 5000 INJECTION INTRAVENOUS; SUBCUTANEOUS at 10:06

## 2020-06-22 RX ADMIN — SODIUM CHLORIDE: 0.9 INJECTION, SOLUTION INTRAVENOUS at 01:06

## 2020-06-22 RX ADMIN — LIDOCAINE HYDROCHLORIDE 80 MG: 20 INJECTION, SOLUTION INTRAVENOUS at 07:06

## 2020-06-22 RX ADMIN — METHOCARBAMOL TABLETS 1000 MG: 500 TABLET, COATED ORAL at 08:06

## 2020-06-22 RX ADMIN — Medication 30 MG: at 07:06

## 2020-06-22 RX ADMIN — ONDANSETRON 4 MG: 2 INJECTION, SOLUTION INTRAMUSCULAR; INTRAVENOUS at 12:06

## 2020-06-22 RX ADMIN — Medication 10 MG: at 02:06

## 2020-06-22 RX ADMIN — Medication 0.2 MCG/KG/MIN: at 07:06

## 2020-06-22 RX ADMIN — SODIUM CHLORIDE: 0.9 INJECTION, SOLUTION INTRAVENOUS at 07:06

## 2020-06-22 RX ADMIN — MUPIROCIN: 20 OINTMENT TOPICAL at 09:06

## 2020-06-22 RX ADMIN — CEFAZOLIN 2 G: 330 INJECTION, POWDER, FOR SOLUTION INTRAMUSCULAR; INTRAVENOUS at 08:06

## 2020-06-22 RX ADMIN — HYDROMORPHONE HYDROCHLORIDE 2 MG: 1 INJECTION, SOLUTION INTRAMUSCULAR; INTRAVENOUS; SUBCUTANEOUS at 08:06

## 2020-06-22 RX ADMIN — PROPOFOL 180 MG: 10 INJECTION, EMULSION INTRAVENOUS at 07:06

## 2020-06-22 RX ADMIN — FAMOTIDINE 20 MG: 20 TABLET ORAL at 08:06

## 2020-06-22 RX ADMIN — IPRATROPIUM BROMIDE AND ALBUTEROL SULFATE 3 ML: .5; 3 SOLUTION RESPIRATORY (INHALATION) at 02:06
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
## 2020-06-22 NOTE — TRANSFER OF CARE
Anesthesia Transfer of Care Note    Patient: Kingsley Holbrook    Procedure(s) Performed: Procedure(s) (LRB):  FUSION, SPINE, LUMBAR, TLIF, MINIMALLY INVASIVE, L2-5 (Left)    Patient location: PACU    Anesthesia Type: general    Transport from OR: Transported from OR on 6-10 L/min O2 by face mask with adequate spontaneous ventilation    Post pain: adequate analgesia    Post assessment: no apparent anesthetic complications and tolerated procedure well    Post vital signs: stable    Level of consciousness: awake and alert    Nausea/Vomiting: no nausea/vomiting    Complications: none    Transfer of care protocol was followed      Last vitals:   Visit Vitals  BP (!) 149/72 (BP Location: Right arm, Patient Position: Lying)   Pulse 71   Temp 36.4 °C (97.5 °F) (Temporal)   Resp 16   Ht 6' (1.829 m)   Wt 86.2 kg (190 lb)   SpO2 100%   BMI 25.77 kg/m²

## 2020-06-22 NOTE — ANESTHESIA PROCEDURE NOTES
Arterial    Diagnosis: transforaminal lumbar interbody fusion    Patient location during procedure: done in OR  Procedure start time: 6/22/2020 7:16 AM  Timeout: 6/22/2020 7:15 AM  Procedure end time: 6/22/2020 7:20 AM    Staffing  Authorizing Provider: Julio Cesar Chong MD  Performing Provider: Domo Saucedo MD    Anesthesiologist was present at the time of the procedure.    Preanesthetic Checklist  Completed: patient identified, site marked, surgical consent, pre-op evaluation, timeout performed, IV checked, risks and benefits discussed, monitors and equipment checked and anesthesia consent givenArterial  Skin Prep: chlorhexidine gluconate  Local Infiltration: none  Orientation: left  Location: radial  Catheter Size: 20 G  Catheter placement by Ultrasound guidance. Heme positive aspiration all ports.  Vessel Caliber: small, patent, compressibility normal  Needle advanced into vessel with real time Ultrasound guidance.Insertion Attempts: 2  Assessment  Dressing: secured with tape and tegaderm
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
## 2020-06-22 NOTE — NURSING TRANSFER
Nursing Transfer Note      6/22/2020     Transfer To: xray then to room 906    Transfer via bed    Transfer with izzy crockett lso    Transported by transport    Medicines sent: no    Chart send with patient: Yes    Notified: texted sister
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
## 2020-06-22 NOTE — ANESTHESIA PROCEDURE NOTES
Intubation  Performed by: Domo Saucedo MD  Authorized by: Julio Cesar Chong MD     Intubation:     Induction:  Intravenous    Intubated:  Postinduction    Mask Ventilation:  Easy with oral airway    Attempts:  1    Attempted By:  Resident anesthesiologist    Method of Intubation:  Direct    Blade:  Bruce 2    Laryngeal View Grade: Grade I - full view of chords      Difficult Airway Encountered?: No      Complications:  None    Airway Device:  Oral endotracheal tube    Airway Device Size:  7.5    Style/Cuff Inflation:  Cuffed (inflated to minimal occlusive pressure)    Tube secured:  22    Secured at:  The teeth    Placement Verified By:  Capnometry    Complicating Factors:  None    Findings Post-Intubation:  BS equal bilateral

## 2020-06-22 NOTE — SUBJECTIVE & OBJECTIVE
Medications Prior to Admission   Medication Sig Dispense Refill Last Dose    albuterol (PROVENTIL) 2.5 mg /3 mL (0.083 %) nebulizer solution Inhale 2.5 mg into the lungs. Take if needed       amLODIPine (NORVASC) 5 MG tablet Take 1 tablet (5 mg total) by mouth once daily. 30 tablet 1     ascorbic acid, vitamin C, (VITAMIN C) 500 MG tablet Take 500 mg by mouth once daily.       budesonide-formoterol 160-4.5 mcg (SYMBICORT) 160-4.5 mcg/actuation HFAA Take if needed & bring       methocarbamoL (ROBAXIN) 750 MG Tab Take 500 mg by mouth 3 (three) times daily. Take if needed       TRELEGY ELLIPTA 100-62.5-25 mcg DsDv Take 1 puff by mouth once daily. Take in am of surgery & bring          Review of patient's allergies indicates:  No Known Allergies    Past Medical History:   Diagnosis Date    Back pain     COPD (chronic obstructive pulmonary disease)     Hypertension      Past Surgical History:   Procedure Laterality Date    COLONOSCOPY       Family History     Problem Relation (Age of Onset)    Heart disease Father (53)        Tobacco Use    Smoking status: Current Every Day Smoker    Smokeless tobacco: Never Used   Substance and Sexual Activity    Alcohol use: Yes     Comment: 1-2 drinks a week     Drug use: Yes     Types: Marijuana    Sexual activity: Not on file     Review of Systems   Constitutional: Negative for activity change, chills, diaphoresis, fatigue and fever.   HENT: Negative for tinnitus, trouble swallowing and voice change.    Eyes: Negative for photophobia and visual disturbance.   Respiratory: Negative for cough, choking, chest tightness, shortness of breath, wheezing and stridor.    Cardiovascular: Negative for chest pain, palpitations and leg swelling.   Gastrointestinal: Negative for abdominal distention, abdominal pain, constipation, diarrhea, nausea and vomiting.   Endocrine: Negative for polydipsia, polyphagia and polyuria.   Genitourinary: Negative for dysuria, frequency, hematuria  and urgency.   Musculoskeletal: Positive for back pain. Negative for gait problem, neck pain and neck stiffness.   Skin: Negative for color change, pallor, rash and wound.   Neurological: Positive for weakness. Negative for dizziness, tremors, seizures, syncope, facial asymmetry, speech difficulty, light-headedness, numbness and headaches.   Hematological: Negative for adenopathy. Does not bruise/bleed easily.   Psychiatric/Behavioral: Negative for agitation, behavioral problems and confusion.     Objective:        There is no height or weight on file to calculate BMI.  Vital Signs (Most Recent):    Vital Signs (24h Range):        Neurosurgery Physical Exam  General: well developed, well nourished, no distress.   Head: normocephalic, atraumatic  Neurologic: Alert and oriented. Thought content appropriate.  GCS: Motor: 6/Verbal: 5/Eyes: 4 GCS Total: 15  Mental Status: Awake, Alert, Oriented x 4  Language: No aphasia  Speech: No dysarthria  Cranial nerves: face symmetric, tongue midline, CN II-XII grossly intact.   Eyes: pupils equal, round, reactive to light with accomodation, EOMI.   Pulmonary: normal respirations, no signs of respiratory distress  Abdomen: soft, non-distended, not tender to palpation  Skin: Skin is warm, dry and intact.  Sensory: intact to light touch throughout  Motor Strength:Moves all extremities spontaneously with good tone.  Full strength upper and lower extremities. No abnormal movements seen.      Reflexes:   DTR: 2+ symmetrically throughout.  Dickson's: Negative.  Clonus: Negative.     Cerebellar:   Gait: slow, antalgic. Patient ambulates stooped forward.   Tandem Gait: Unable to perform 2/2 balance and pain  Able to walk on heels & toes with moderate difficulty 2/2 balance and pain     Lumbar:  Midline TTP: Mildly present throughout lumbar spine.  Paraspinal TTP: Present on left throughout lumbar spine  Straight Leg Test: Present on left.     Other:  SI joint TTP: Negative.  Greater  trochanter TTP: Negative.  Tenderness with external/internal hip rotation: Negative.    Significant Labs:  No results for input(s): GLU, NA, K, CL, CO2, BUN, CREATININE, CALCIUM, MG in the last 48 hours.  No results for input(s): WBC, HGB, HCT, PLT in the last 48 hours.  No results for input(s): LABPT, INR, APTT in the last 48 hours.  Microbiology Results (last 7 days)     ** No results found for the last 168 hours. **        ABGs: No results for input(s): PH, PCO2, PO2, HCO3, POCSATURATED, BE in the last 48 hours.  Cardiac markers: No results for input(s): CKMB, CPKMB, TROPONINT, TROPONINI, MYOGLOBIN in the last 48 hours.  CMP: No results for input(s): GLU, CALCIUM, ALBUMIN, PROT, NA, K, CO2, CL, BUN, CREATININE, ALKPHOS, ALT, AST, BILITOT in the last 48 hours.  CRP: No results for input(s): CRP in the last 48 hours.  ESR: No results for input(s): POCESR, ERYTHROCYTES in the last 48 hours.  LFTs: No results for input(s): ALT, AST, ALKPHOS, BILITOT, PROT, ALBUMIN in the last 48 hours.  Procalcitonin: No results for input(s): PROCAL in the last 48 hours.    Significant Diagnostics:  I have reviewed all pertinent imaging results/findings within the past 24 hours.

## 2020-06-22 NOTE — ASSESSMENT & PLAN NOTE
Mr. Kingsley Holbrook is a 69 year old male with a PMHx of tobacco abuse (0.5-1 ppd), on ASA 81 mg daily for preventative health, who presents with multilevel lumbar stenosis in the setting of neurogenic claudication for L2-5 TLIFs:    --Patient evaluated prior to surgery  --All diagnostics and imaging reviewed  --Patient NPO since MN  --No anti-coag/plt medication in the last 72h  --Patient consented and all questions answered  --Further reccs to follow surgery

## 2020-06-22 NOTE — BRIEF OP NOTE
[facility]  Brief Operative Note    SUMMARY     Surgery Date: 6/22/2020     Surgeon(s) and Role:     * Terry Ba MD - Primary    Assisting Surgeon: You Salvador MD & Armida Hutson PA-C    Pre-op Diagnosis:  Other spondylosis with radiculopathy, lumbar region [M47.26]  Lumbar stenosis with neurogenic claudication [M48.062]    Post-op Diagnosis:  Post-Op Diagnosis Codes:     * Other spondylosis with radiculopathy, lumbar region [M47.26]     * Lumbar stenosis with neurogenic claudication [M48.062]    Procedure(s) (LRB):  FUSION, SPINE, LUMBAR, TLIF, MINIMALLY INVASIVE, L2-5 (Left)    Anesthesia: General    Description of Procedure: L2-5 TLIFs    Description of the findings of the procedure: See above    Estimated Blood Loss: 50cc         Specimens:   Specimen (12h ago, onward)    None        The patient was seen at bedside following the procedure. The patient was still waking up from anesthesia but grossly at his neurological baseline, pain was reasonably controlled in the post-operative period, and all questions and/or complaints were directly addressed at bedside by the NSGY resident. All post-operative management orders have been placed and the patient will be going to a room shortly.

## 2020-06-23 PROCEDURE — 97165 OT EVAL LOW COMPLEX 30 MIN: CPT

## 2020-06-23 PROCEDURE — 99900035 HC TECH TIME PER 15 MIN (STAT)

## 2020-06-23 PROCEDURE — 97530 THERAPEUTIC ACTIVITIES: CPT

## 2020-06-23 PROCEDURE — 11000001 HC ACUTE MED/SURG PRIVATE ROOM

## 2020-06-23 PROCEDURE — 25000242 PHARM REV CODE 250 ALT 637 W/ HCPCS: Performed by: PHYSICIAN ASSISTANT

## 2020-06-23 PROCEDURE — 99024 POSTOP FOLLOW-UP VISIT: CPT | Mod: ,,, | Performed by: PHYSICIAN ASSISTANT

## 2020-06-23 PROCEDURE — 25000003 PHARM REV CODE 250: Performed by: PHYSICIAN ASSISTANT

## 2020-06-23 PROCEDURE — 97161 PT EVAL LOW COMPLEX 20 MIN: CPT

## 2020-06-23 PROCEDURE — 25000003 PHARM REV CODE 250: Performed by: STUDENT IN AN ORGANIZED HEALTH CARE EDUCATION/TRAINING PROGRAM

## 2020-06-23 PROCEDURE — 94761 N-INVAS EAR/PLS OXIMETRY MLT: CPT

## 2020-06-23 PROCEDURE — 99024 PR POST-OP FOLLOW-UP VISIT: ICD-10-PCS | Mod: ,,, | Performed by: PHYSICIAN ASSISTANT

## 2020-06-23 PROCEDURE — 97116 GAIT TRAINING THERAPY: CPT

## 2020-06-23 PROCEDURE — 94640 AIRWAY INHALATION TREATMENT: CPT

## 2020-06-23 PROCEDURE — 94799 UNLISTED PULMONARY SVC/PX: CPT

## 2020-06-23 PROCEDURE — 63600175 PHARM REV CODE 636 W HCPCS: Performed by: STUDENT IN AN ORGANIZED HEALTH CARE EDUCATION/TRAINING PROGRAM

## 2020-06-23 RX ORDER — OXYCODONE AND ACETAMINOPHEN 7.5; 325 MG/1; MG/1
1 TABLET ORAL EVERY 4 HOURS PRN
Status: DISCONTINUED | OUTPATIENT
Start: 2020-06-23 | End: 2020-06-25 | Stop reason: HOSPADM

## 2020-06-23 RX ORDER — POLYETHYLENE GLYCOL 3350 17 G/17G
17 POWDER, FOR SOLUTION ORAL DAILY
Status: DISCONTINUED | OUTPATIENT
Start: 2020-06-23 | End: 2020-06-25 | Stop reason: HOSPADM

## 2020-06-23 RX ORDER — HYDROMORPHONE HYDROCHLORIDE 1 MG/ML
2 INJECTION, SOLUTION INTRAMUSCULAR; INTRAVENOUS; SUBCUTANEOUS
Status: DISCONTINUED | OUTPATIENT
Start: 2020-06-23 | End: 2020-06-23

## 2020-06-23 RX ORDER — OXYCODONE AND ACETAMINOPHEN 10; 325 MG/1; MG/1
1 TABLET ORAL EVERY 4 HOURS PRN
Status: DISCONTINUED | OUTPATIENT
Start: 2020-06-23 | End: 2020-06-25 | Stop reason: HOSPADM

## 2020-06-23 RX ADMIN — MUPIROCIN: 20 OINTMENT TOPICAL at 09:06

## 2020-06-23 RX ADMIN — METHOCARBAMOL TABLETS 1000 MG: 500 TABLET, COATED ORAL at 12:06

## 2020-06-23 RX ADMIN — OXYCODONE HYDROCHLORIDE AND ACETAMINOPHEN 1 TABLET: 10; 325 TABLET ORAL at 10:06

## 2020-06-23 RX ADMIN — MUPIROCIN: 20 OINTMENT TOPICAL at 08:06

## 2020-06-23 RX ADMIN — FLUTICASONE FUROATE AND VILANTEROL TRIFENATATE 1 PUFF: 100; 25 POWDER RESPIRATORY (INHALATION) at 08:06

## 2020-06-23 RX ADMIN — HEPARIN SODIUM 5000 UNITS: 5000 INJECTION INTRAVENOUS; SUBCUTANEOUS at 09:06

## 2020-06-23 RX ADMIN — POLYETHYLENE GLYCOL 3350 17 G: 17 POWDER, FOR SOLUTION ORAL at 08:06

## 2020-06-23 RX ADMIN — FAMOTIDINE 20 MG: 20 TABLET ORAL at 09:06

## 2020-06-23 RX ADMIN — DOCUSATE SODIUM 50 MG AND SENNOSIDES 8.6 MG 1 TABLET: 8.6; 5 TABLET, FILM COATED ORAL at 08:06

## 2020-06-23 RX ADMIN — OXYCODONE HYDROCHLORIDE AND ACETAMINOPHEN 1 TABLET: 10; 325 TABLET ORAL at 06:06

## 2020-06-23 RX ADMIN — METHOCARBAMOL TABLETS 1000 MG: 500 TABLET, COATED ORAL at 09:06

## 2020-06-23 RX ADMIN — FAMOTIDINE 20 MG: 20 TABLET ORAL at 08:06

## 2020-06-23 RX ADMIN — CEFAZOLIN 2 G: 330 INJECTION, POWDER, FOR SOLUTION INTRAMUSCULAR; INTRAVENOUS at 03:06

## 2020-06-23 RX ADMIN — HEPARIN SODIUM 5000 UNITS: 5000 INJECTION INTRAVENOUS; SUBCUTANEOUS at 05:06

## 2020-06-23 RX ADMIN — HYDROCODONE BITARTRATE AND ACETAMINOPHEN 1 TABLET: 10; 325 TABLET ORAL at 03:06

## 2020-06-23 RX ADMIN — METHOCARBAMOL TABLETS 1000 MG: 500 TABLET, COATED ORAL at 04:06

## 2020-06-23 RX ADMIN — METHOCARBAMOL TABLETS 1000 MG: 500 TABLET, COATED ORAL at 08:06

## 2020-06-23 RX ADMIN — AMLODIPINE BESYLATE 5 MG: 5 TABLET ORAL at 08:06

## 2020-06-23 RX ADMIN — HEPARIN SODIUM 5000 UNITS: 5000 INJECTION INTRAVENOUS; SUBCUTANEOUS at 02:06

## 2020-06-23 NOTE — PLAN OF CARE
Problem: Adult Inpatient Plan of Care  Goal: Plan of Care Review  Outcome: Ongoing, Not Progressing  Goal: Patient-Specific Goal (Individualization)  Outcome: Ongoing, Not Progressing  Goal: Absence of Hospital-Acquired Illness or Injury  Outcome: Ongoing, Not Progressing  Goal: Optimal Comfort and Wellbeing  Outcome: Ongoing, Not Progressing     Problem: Fall Injury Risk  Goal: Absence of Fall and Fall-Related Injury  Outcome: Ongoing, Not Progressing     Problem: Infection  Goal: Infection Symptom Resolution  Outcome: Ongoing, Not Progressing       POC reviewed with pt, voiced understanding, ongoing, progressing, A/O x4, cont b/b, LSO brace on when OOB, alarms on and audible, suction is at the bedside, wheels locked, call light/personal items within reach and informed to call for mobility asssistance

## 2020-06-23 NOTE — SUBJECTIVE & OBJECTIVE
"Interval History: POD 1 L2-5 MIV TILRADHAElla LIUSALOME. AFVSS. Patient reports expected post operative pain, reports "hallucinations" with the IV pain medication, will d/c and increase oral pain medications. D/C magana today. OOB with PT/OT today. Post op x-rays with hardware in place. Patient denies any new weakness, paresthesias, dizziness, headache, chest pain, SOB.    Medications:  Continuous Infusions:  Scheduled Meds:   amLODIPine  5 mg Oral Daily    famotidine  20 mg Oral BID    fluticasone furoate-vilanteroL  1 puff Inhalation Daily    heparin (porcine)  5,000 Units Subcutaneous Q8H    methocarbamoL  1,000 mg Oral QID    mupirocin   Nasal BID    polyethylene glycol  17 g Oral Daily    senna-docusate 8.6-50 mg  1 tablet Oral Daily    tiotropium  1 capsule Inhalation Daily     PRN Meds:acetaminophen, albuterol sulfate, aluminum-magnesium hydroxide-simethicone, HYDROmorphone, labetaloL, ondansetron, oxyCODONE-acetaminophen, oxyCODONE-acetaminophen, promethazine (PHENERGAN) IVPB     Review of Systems  Objective:     Weight: 86.2 kg (190 lb)  Body mass index is 25.77 kg/m².  Vital Signs (Most Recent):  Temp: 98 °F (36.7 °C) (06/23/20 0400)  Pulse: 87 (06/23/20 0400)  Resp: 18 (06/23/20 0400)  BP: (!) 151/86 (06/23/20 0400)  SpO2: 98 % (06/23/20 0400) Vital Signs (24h Range):  Temp:  [96.8 °F (36 °C)-98 °F (36.7 °C)] 98 °F (36.7 °C)  Pulse:  [64-87] 87  Resp:  [10-20] 18  SpO2:  [94 %-100 %] 98 %  BP: (149-199)/(66-98) 151/86                          Urethral Catheter 06/22/20 0730 Non-latex;Straight-tip 16 Fr. (Active)   Site Assessment Clean;Intact 06/22/20 1948   Collection Container Urimeter 06/22/20 1948   Securement Method secured to top of thigh w/ adhesive device 06/22/20 1948   Catheter Care Performed yes 06/22/20 1948   Reason for Continuing Urinary Catheterization Post operative 06/22/20 1948   CAUTI Prevention Bundle StatLock in place w 1" slack;Intact seal between catheter & drainage tubing;Drainage " bag/urimeter off the floor;Green sheeting clip in use;No dependent loops or kinks;Drainage bag/urimeter not overfilled (<2/3 full);Drainage bag/urimeter below bladder 06/22/20 1948   Output (mL) 500 mL 06/22/20 1520       Neurosurgery Physical Exam  General: well developed, well nourished, no distress.   Head: normocephalic, atraumatic  Neurologic: Alert and oriented. Thought content appropriate.  GCS: Motor: 6/Verbal: 5/Eyes: 4 GCS Total: 15  Mental Status: Awake, Alert, Oriented x 4  Language: No aphasia  Speech: No dysarthria  Cranial nerves: face symmetric, tongue midline, CN II-XII grossly intact.   Eyes: pupils equal, round, reactive to light with accommodation, EOMI.   Pulmonary: normal respirations, no signs of respiratory distress  Abdomen: soft, non-distended, not tender to palpation  Skin: Skin is warm, dry and intact.  Sensory: intact to light touch throughout    Motor Strength: No abnormal movements seen.     Strength  Deltoids Triceps Biceps Wrist Extension Wrist Flexion Hand    Upper: R 5/5 5/5 5/5 5/5 5/5 5/5    L 5/5 5/5 5/5 5/5 5/5 5/5     Iliopsoas Quadriceps Knee  Flexion Tibialis  anterior Gastro- cnemius EHL   Lower: R 5/5 5/5 5/5 5/5 5/5 5/5    L 5/5 5/5 5/5 5/5 5/5 5/5     Reflexes:   Dickson's: Negative.  Clonus: Negative.     Incision: Incision C/D/I with post operative dressing in place.     Significant Labs:  Recent Labs   Lab 06/22/20  0604   *      K 4.0      CO2 23   BUN 8   CREATININE 1.0   CALCIUM 9.2     Recent Labs   Lab 06/22/20  0604   WBC 7.05   HGB 14.0   HCT 43.7        Recent Labs   Lab 06/22/20  0604   INR 1.0   APTT 26.9     Microbiology Results (last 7 days)     ** No results found for the last 168 hours. **        All pertinent labs from the last 24 hours have been reviewed.    Significant Diagnostics:  I independently reviewed the imaging.      Lumbar spine post operative x-rays AP/Lat: L2-L5 hardware in place without malalignment

## 2020-06-23 NOTE — PT/OT/SLP EVAL
Physical Therapy Evaluation    Patient Name:  Kingsley Holbrook   MRN:  [id number]    Recommendations:     Discharge Recommendations:  home with home health, home health PT, home health OT   Discharge Equipment Recommendations: bedside commode, walker, rolling   Barriers to discharge: None    Assessment:     Kingsley Holbrook is a 70 y.o. male admitted with a medical diagnosis of Lumbar stenosis.  He presents with the following impairments/functional limitations:  impaired endurance, impaired functional mobilty, impaired balance, gait instability, pain, orthopedic precautions . Patient donned TLSO w/ assist at EOB; educated in spinal precautions; transfers w/ SBA./CGA and ambulates w/ RW and  feet. .    Rehab Prognosis: Good; patient would benefit from acute skilled PT services to address these deficits and reach maximum level of function.    Recent Surgery: Procedure(s) (LRB):  FUSION, SPINE, LUMBAR, TLIF, MINIMALLY INVASIVE, L2-5 (Left) 1 Day Post-Op    Plan:     During this hospitalization, patient to be seen 4 x/week to address the identified rehab impairments via gait training, therapeutic activities, therapeutic exercises and progress toward the following goals:    · Plan of Care Expires:  07/23/20    Subjective     Chief Complaint: wants to get catheter out  Patient/Family Comments/goals: return to PLOF   Pain/Comfort:  · Pain Rating 1: 7/10  · Location - Orientation 1: posterior  · Location 1: back  · Pain Addressed 1: Pre-medicate for activity, Reposition, Distraction  · Pain Rating Post-Intervention 1: 7/10    Patients cultural, spiritual, [employer] conflicts given the current situation:      Living Environment:  Patient lives alone in apartment w/ elevator access and ramp to enter. His girlfriend and sister assist as needed.   Prior to admission, patients level of function was mod(I) w/ SPC.  Equipment used at home: cane, straight.  DME owned (not currently used): none.  Upon discharge, patient will have  assistance from sister, girlfriend intermittently.    Objective:     Communicated with nurse prior to session.  Patient found HOB elevated with magana catheter  upon PT entry to room.    General Precautions: Standard, fall   Orthopedic Precautions:spinal precautions   Braces: TLSO     Exams:  · Cognitive Exam:  Patient is oriented to Person, Place, Time and Situation  · Gross Motor Coordination:  WFL  · Postural Exam:  Patient presented with the following abnormalities:    · -       No postural abnormalities identified  · Sensation:    · -       Intact  · Skin Integrity/Edema:      · -       none noted  · RLE ROM: WNL  · RLE Strength: WNL  · LLE ROM: WNL  · LLE Strength: WNL    Functional Mobility:  · Bed Mobility:     · Rolling Right: stand by assistance and w/ logroll technique/ spinal precautions  · Supine to Sit: stand by assistance and  spinal precautions  · Transfers:     · Sit to Stand:  contact guard assistance with rolling walker from EOB; SBA to stand from bedside chair w/ RW  · Bed to Chair: contact guard assistance with  rolling walker  using  Step Transfer  · Gait: w/ RW and  feet. No LOB. Equal steps. RLE noted to internally rotate. Forward flexed posture at hips, improves w/ cues. TLSO.    Therapeutic Activities and Exercises:   patient educated in PT POC, gait and trf tech, spinal precautions and can name 3/3. Educated on use of TLSO, navi and doff and needed for OOB.    AM-PAC 6 CLICK MOBILITY  Total Score:18     Patient left up in chair with all lines intact, call button in reach and nurse notified.    GOALS:   Multidisciplinary Problems     Physical Therapy Goals        Problem: Physical Therapy Goal    Goal Priority Disciplines Outcome Goal Variances Interventions   Physical Therapy Goal     PT, PT/OT Ongoing, Progressing     Description: Goals to be met by: 2020     Patient will increase functional independence with mobility by performin. Supine to sit with Set-up [place]  and observing spinal precautions  2. Sit to supine with Set-up [place] and observing spinal precautions  3. Sit to stand transfer with Supervision  and observing spinal precautions/ TLSO  4. Bed to chair transfer with Supervision using Rolling Walker or LRAD, as appropriate  and observing spinal precautions/TLSO  5. Gait  x 150 feet with Stand-by Assistance using Rolling Walker. Or LRAD, as appropriate  and observing spinal precautions  6. Lower extremity exercise program x20 reps  with assistance as needed                     History:     Past Medical History:   Diagnosis Date    Back pain     COPD (chronic obstructive pulmonary disease)     Hypertension        Past Surgical History:   Procedure Laterality Date    COLONOSCOPY      MINIMALLY INVASIVE TRANSFORAMINAL LUMBAR INTERBODY FUSION (TLIF) Left 6/22/2020    Procedure: FUSION, SPINE, LUMBAR, TLIF, MINIMALLY INVASIVE, L2-5;  Surgeon: Terry Ba MD;  Location: [facility] OR [address] [place] [address];  Service: Neurosurgery;  Laterality: Left;  TOR 1  ASA 1  T&S 2 UNITS  EMG/SEP/MEP  C-ARM  SMITA TABLE 4 POSTER  PRONE HEADREST       Time Tracking:     PT Received On: 06/23/20  PT Start Time: 1119     PT Stop Time: 1145  PT Total Time (min): 26 min     Billable Minutes: Evaluation 10 and Gait Training 16      Heidi Singh, PT  06/23/2020

## 2020-06-23 NOTE — PLAN OF CARE
CM met with patient to obtain discharge planning assessment.  Patient girlfriend at bedside.  Patient is POD 1 for min invasive lumbar TLIF.  Planned discharge is home with home health - Plan (A) or Rehab - Plan (B).    PCP:  Alona Davidson NP     Payor:  Payor: [employer] BLUE MEDICARE ADVANTAGE / Plan: [employer] DUAL ADVANTAGE / Product Type: Medicare Advantage /      Pharmacy:    [id number] [place], [place] - [address] [facility] [address]  [address]  [place] [address]  Phone: [phone] Fax: [phone]    06/23/20 1333   Discharge Assessment   Assessment Type Discharge Planning Assessment   Confirmed/corrected address and phone number on facesheet? Yes   Assessment information obtained from? Patient   Expected Length of Stay (days) 2   Communicated expected length of stay with patient/caregiver yes   Prior to hospitilization cognitive status: Alert/Oriented   Prior to hospitalization functional status: Assistive Equipment   Current cognitive status: Alert/Oriented   Current Functional Status: Assistive Equipment   Lives With alone   Able to Return to Prior Arrangements yes   Is patient able to care for self after discharge? Yes   Who are your caregiver(s) and their phone number(s)? Arash chiu [phone]   Patient's perception of discharge disposition home health   Readmission Within the Last 30 Days no previous admission in last 30 days   Patient currently being followed by outpatient case management? No   Patient currently receives any other outside agency services? No   Equipment Currently Used at Home cane, straight   Do you have any problems affording any of your prescribed medications? No   Is the patient taking medications as prescribed? yes   Does the patient have transportation home? Yes   Transportation Anticipated family or friend will provide   Does the patient receive services at the [facility]? No   Discharge Plan A Home Health   Discharge Plan B  Rehab   DME Needed Upon Discharge  walker, rolling

## 2020-06-23 NOTE — PT/OT/SLP EVAL
"Occupational Therapy   Evaluation    Name: Kingsley Holrbook  MRN: [id number]  Admitting Diagnosis:  Lumbar stenosis 1 Day Post-Op  FUSION, SPINE, LUMBAR, TLIF, MINIMALLY INVASIVE, L2-5 (Left)     Recommendations:     Discharge Recommendations: home with home health  Discharge Equipment Recommendations:  bedside commode, walker, rolling, shower chair  Barriers to discharge:  None    Assessment:     Kingsley Holbrook is a 70 y.o. male with a medical diagnosis of Lumbar stenosis.  He presents with performance deficits including weakness, impaired endurance, impaired self care skills, impaired functional mobilty, impaired balance, gait instability, pain, decreased lower extremity function, decreased upper extremity function, decreased coordination. Pt progressing toward goals and would continue to benefit from OT to increase functional independence and safety. Recommend HH upon D/C.      Rehab Prognosis: Good; patient would benefit from acute skilled OT services to address these deficits and reach maximum level of function.       Plan:     Patient to be seen 4 x/week to address the above listed problems via self-care/home management, therapeutic activities, therapeutic exercises, neuromuscular re-education  · Plan of Care Expires: 07/23/20  · Plan of Care Reviewed with: patient, significant other, sibling    Subjective     Chief Complaint: Pain  Patient/Family Comments/goals: Return to PLOF    Occupational Profile:  Living Environment: Pt lives alone in 4th floor apartment with elevator access and tub/shower combo with grab bars. Sister reports it's a "senior center" and accessible.  Previous level of function: Uses cane for mobility as needed, reports (I) with ADLs; retired  Equipment Used at Home:  cane, straight  Assistance upon Discharge: Family and significant other are able to assist    Pain/Comfort:  · Pain Rating 1: 7/10  · Location 1: back  · Pain Addressed 1: Pre-medicate for activity, Reposition  · Pain Rating " Post-Intervention 1: 7/10    Patients cultural, spiritual, Church conflicts given the current situation: no    Objective:     Communicated with: RN prior to session. Patient found with HOB elevated with telemetry, peripheral IV, magana catheter upon OT entry to room, significant other and sister present.    General Precautions: Standard, fall   Orthopedic Precautions:spinal precautions   Braces: TLSO     Occupational Performance:    Bed Mobility:    · Patient completed Rolling/Turning to Right with stand by assistance  · Patient completed Supine to Sit with stand by assistance with cues for technique    Functional Mobility/Transfers:  · Patient completed Sit <> Stand Transfer with CGA from EOB, SBA from bedside chair without AD-- used rolling walker once in standing  · Functional Mobility: Within room and hallway ~100 ft with SBA using rolling walker    Activities of Daily Living:  · Upper Body Dressing: moderate assistance to don TLSO while seated EOB  · Lower Body Dressing: total assistance to don socks due to unable to come to 4 pt position    Cognitive/Visual Perceptual:  Cognitive/Psychosocial Skills:     -       Oriented to: Person, Place, Time and Situation   -       Follows Commands/attention: Follows one-step commands  -       Communication: clear/fluent  -       Safety awareness/insight to disability: intact   Visual/Perceptual:      -Intact      Physical Exam:  Balance:    -       good sitting and standing balance  Sensation:    -       Intact B UEs  Upper Extremity Range of Motion:     -       Right Upper Extremity: WFL  -       Left Upper Extremity: WFL  Upper Extremity Strength:    -       Right Upper Extremity: WFL  -       Left Upper Extremity: WFL   Strength:    -       Right Upper Extremity: WFL  -       Left Upper Extremity: WFL  Fine Motor Coordination:    -       Intact    AMPAC 6 Click ADL:  AMPAC Total Score: 17    Treatment & Education:  OT eval; educated on OT role and POC as well as  spinal precautions, TLSO management, and to call for assistance before getting up; discussed D/C and DME recs with pt and family-- rolling walker ordered for use in room; pt recalled 0/3 spinal precautions at end of session  Education:    Patient left up in chair with all lines intact, call button in reach, RN notified and family present    GOALS:   Multidisciplinary Problems     Occupational Therapy Goals        Problem: Occupational Therapy Goal    Goal Priority Disciplines Outcome Interventions   Occupational Therapy Goal     OT, PT/OT Ongoing, Progressing    Description: Goals to be met by: 7 days (6/30/20)     Patient will increase functional independence with ADLs by performing:    UE Dressing with Stand-by Assistance including TLSO.  LE Dressing with Contact Guard Assistance using AD as needed.  Grooming while standing at sink with Stand-by Assistance.  Toileting from toilet with Stand-by Assistance for hygiene and clothing management.   Supine to sit with Supervision while maintaining spinal precautions.  Toilet transfer to toilet with Stand-by Assistance.  Complete functional mobility household distance with Supervision using AD as needed.                     History:     Past Medical History:   Diagnosis Date    Back pain     COPD (chronic obstructive pulmonary disease)     Hypertension        Past Surgical History:   Procedure Laterality Date    COLONOSCOPY      MINIMALLY INVASIVE TRANSFORAMINAL LUMBAR INTERBODY FUSION (TLIF) Left 6/22/2020    Procedure: FUSION, SPINE, LUMBAR, TLIF, MINIMALLY INVASIVE, L2-5;  Surgeon: Terry Ba MD;  Location: [facility] OR [address] [place] [address];  Service: Neurosurgery;  Laterality: Left;  TOR 1  ASA 1  T&S 2 UNITS  EMG/SEP/MEP  C-ARM  SMITA TABLE 4 POSTER  PRONE HEADREST       Time Tracking:     OT Date of Treatment: 06/23/20  OT Start Time: 1118  OT Stop Time: 1146  OT Total Time (min): 28 min    Billable Minutes:Evaluation 15  Therapeutic Activity 13    Nathalie Richards  LOTR  6/23/2020
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
## 2020-06-23 NOTE — ANESTHESIA POSTPROCEDURE EVALUATION
Anesthesia Post Evaluation    Patient: Kingsley Holbrook    Procedure(s) Performed: Procedure(s) (LRB):  FUSION, SPINE, LUMBAR, TLIF, MINIMALLY INVASIVE, L2-5 (Left)    Final Anesthesia Type: general    Patient location during evaluation: PACU  Patient participation: Yes- Able to Participate  Level of consciousness: awake and alert and oriented  Post-procedure vital signs: reviewed and stable  Pain management: adequate  Airway patency: patent    PONV status at discharge: No PONV  Anesthetic complications: no      Cardiovascular status: blood pressure returned to baseline, hemodynamically stable and hypertensive  Respiratory status: unassisted, spontaneous ventilation and room air  Hydration status: euvolemic  Follow-up not needed.          Vitals Value Taken Time   /86 06/23/20 0400   Temp 36.7 °C (98 °F) 06/23/20 0400   Pulse 77 06/23/20 0701   Resp 18 06/23/20 0400   SpO2 98 % 06/23/20 0400         Event Time   Out of Recovery 15:00:00         Pain/Julia Score: Pain Rating Prior to Med Admin: 8 (6/23/2020  3:58 AM)  Pain Rating Post Med Admin: 2 (6/23/2020  4:58 AM)  Julia Score: 10 (6/22/2020  3:20 PM)

## 2020-06-23 NOTE — HOSPITAL COURSE
"6/22: POD 0 L2-5 MIV TILF. Patient full strength.   6/23: POD 1 L2-5 MIV TILF. NAEON. AFVSS. Patient reports expected post operative pain, reports "hallucinations" with the IV pain medication, will d/c and increase oral pain medications. D/C magana today. OOB with PT/OT today. Post op x-rays with hardware in place. Patient denies any new weakness, paresthesias, dizziness, headache, chest pain, SOB.   6/24: POD 2 L2-L5 MIV TLIF. NAEON. AFVSS. Patient reports back pain much improved from yesterday morning, still endorses "tightness," but denies any new weakness, numbness/tingling, bladder/bowel dysfunction. Patient reports voiding without issues since magana removed yesterday. Reports passing flatus. Dressing was removed from incision and incision is C/D/I without erythema, swelling, drainage or other signs of infection, edges are well approximated. Patient feels stable to discharge home. He is medically stable for discharge. I personally discussed post operative wound care and weight lifting restrictions with the patient and his sister, Cole, who will be patient's caregiver. The patient will follow up with the neurosurgery clinic in 2 weeks for incision check, he is to call our clinic at any point during this interim with any questions/concerns or new or worsening symptoms. The patient and his sister both expressed good understanding and agreed to the above plan. All questions were answered.   "

## 2020-06-23 NOTE — ASSESSMENT & PLAN NOTE
Mr. Kingsley Holbrook is a 69 year old male with a PMHx of tobacco abuse (0.5-1 ppd), on ASA 81 mg daily for preventative health, who presents with multilevel lumbar stenosis in the setting of neurogenic claudication for L2-5 TLIFs:    --Neurologically stable   -Q4H neuro checks while in hospital  --Post operative pain control. D/C IV pain medications as patient reports hallucinations, increase oral mediations.   --TLSO brace when OOB  --Post operative x-rays reviewed, hardware in place without malalignment   --D/C magana today. Bladder scan Q6H if patient has not voided, In/Out straight catheter for residuals >300cc  --HTN: SBP < 160. Continue home norvasc 5 mg daily  --COPD: Continue home inhalers. SpO2 % on RA. CTM  --DVT prophylaxis: TATIANA's, SCD's, SQH  --Bowel regimen: senna BID and miralax daily  --Atelectasis prevention: IS hourly while awake, PT/OT, OOB > 6 hours per day    Discussed with Dr. Ba

## 2020-06-23 NOTE — ASSESSMENT & PLAN NOTE
"Mr. Kingsley Holbrook is a 69 year old male with a PMHx of tobacco abuse (0.5-1 ppd), on ASA 81 mg daily for preventative health, who presents with multilevel lumbar stenosis in the setting of neurogenic claudication for L2-5 TLIFs:    POD 2 L2-L5 MIV TLIF. GARRETT. AFVSS. Patient reports back pain much improved from yesterday morning, still endorses "tightness," but denies any new weakness, numbness/tingling, bladder/bowel dysfunction. Patient reports voiding without issues since magana removed yesterday. Reports passing flatus. Dressing was removed from incision and incision is C/D/I without erythema, swelling, drainage or other signs of infection, edges are well approximated. Patient feels stable to discharge home. He is medically stable for discharge. I personally discussed post operative wound care and weight lifting restrictions with the patient and his sister, Cole, who will be patient's caregiver. The patient will follow up with the neurosurgery clinic in 2 weeks for incision check, he is to call our clinic at any point during this interim with any questions/concerns or new or worsening symptoms. The patient and his sister both expressed good understanding and agreed to the above plan. All questions were answered.     --Neurologically stable  --Post operative pain control.  --LSO brace when OOB  --Post operative x-rays reviewed, hardware in place without malalignment   --HTN: SBP < 160. Continue home norvasc 5 mg daily  --COPD: Continue home inhalers. SpO2 % on RA. CTM  --DVT prophylaxis: TATIANA's, SCD's, SQH  --Bowel regimen: senna BID and miralax daily  --Atelectasis prevention: IS hourly while awake, PT/OT, OOB > 6 hours per day    Discussed with Dr. Ba      "

## 2020-06-23 NOTE — PLAN OF CARE
Eval and POC set 6/23/20    Problem: Occupational Therapy Goal  Goal: Occupational Therapy Goal  Description: Goals to be met by: 7 days (6/30/20)     Patient will increase functional independence with ADLs by performing:    UE Dressing with Stand-by Assistance including TLSO.  LE Dressing with Contact Guard Assistance using AD as needed.  Grooming while standing at sink with Stand-by Assistance.  Toileting from toilet with Stand-by Assistance for hygiene and clothing management.   Supine to sit with Supervision while maintaining spinal precautions.  Toilet transfer to toilet with Stand-by Assistance.  Complete functional mobility household distance with Supervision using AD as needed.    Outcome: Ongoing, Progressing

## 2020-06-23 NOTE — PLAN OF CARE
Problem: Physical Therapy Goal  Goal: Physical Therapy Goal  Description: Goals to be met by: 2020     Patient will increase functional independence with mobility by performin. Supine to sit with Set-up [place] and observing spinal precautions  2. Sit to supine with Set-up [place] and observing spinal precautions  3. Sit to stand transfer with Supervision  and observing spinal precautions/ TLSO  4. Bed to chair transfer with Supervision using Rolling Walker or LRAD, as appropriate  and observing spinal precautions/TLSO  5. Gait  x 150 feet with Stand-by Assistance using Rolling Walker. Or LRAD, as appropriate  and observing spinal precautions  6. Lower extremity exercise program x20 reps  with assistance as needed    PT jamil completed. Heidi Snigh, PT 2020

## 2020-06-24 ENCOUNTER — TELEPHONE (OUTPATIENT)
Dept: NEUROSURGERY | Facility: CLINIC | Age: 70
End: 2020-06-24

## 2020-06-24 VITALS
TEMPERATURE: 98 F | BODY MASS INDEX: 25.74 KG/M2 | WEIGHT: 190.06 LBS | SYSTOLIC BLOOD PRESSURE: 179 MMHG | HEART RATE: 79 BPM | OXYGEN SATURATION: 95 % | DIASTOLIC BLOOD PRESSURE: 81 MMHG | RESPIRATION RATE: 18 BRPM | HEIGHT: 72 IN

## 2020-06-24 PROCEDURE — 25000003 PHARM REV CODE 250: Performed by: PHYSICIAN ASSISTANT

## 2020-06-24 PROCEDURE — 99024 PR POST-OP FOLLOW-UP VISIT: ICD-10-PCS | Mod: ,,, | Performed by: PHYSICIAN ASSISTANT

## 2020-06-24 PROCEDURE — 25000242 PHARM REV CODE 250 ALT 637 W/ HCPCS: Performed by: PHYSICIAN ASSISTANT

## 2020-06-24 PROCEDURE — 11000001 HC ACUTE MED/SURG PRIVATE ROOM

## 2020-06-24 PROCEDURE — 25000003 PHARM REV CODE 250: Performed by: STUDENT IN AN ORGANIZED HEALTH CARE EDUCATION/TRAINING PROGRAM

## 2020-06-24 PROCEDURE — 99024 POSTOP FOLLOW-UP VISIT: CPT | Mod: ,,, | Performed by: PHYSICIAN ASSISTANT

## 2020-06-24 PROCEDURE — 63600175 PHARM REV CODE 636 W HCPCS: Performed by: STUDENT IN AN ORGANIZED HEALTH CARE EDUCATION/TRAINING PROGRAM

## 2020-06-24 RX ORDER — OXYCODONE AND ACETAMINOPHEN 7.5; 325 MG/1; MG/1
1 TABLET ORAL EVERY 4 HOURS PRN
Qty: 60 TABLET | Refills: 0 | Status: SHIPPED | OUTPATIENT
Start: 2020-06-24 | End: 2020-07-03

## 2020-06-24 RX ORDER — METHOCARBAMOL 750 MG/1
750 TABLET, FILM COATED ORAL 3 TIMES DAILY
Qty: 42 TABLET | Refills: 1 | Status: SHIPPED | OUTPATIENT
Start: 2020-06-24 | End: 2020-08-17 | Stop reason: SDUPTHER

## 2020-06-24 RX ADMIN — MUPIROCIN: 20 OINTMENT TOPICAL at 08:06

## 2020-06-24 RX ADMIN — METHOCARBAMOL TABLETS 1000 MG: 500 TABLET, COATED ORAL at 08:06

## 2020-06-24 RX ADMIN — OXYCODONE HYDROCHLORIDE AND ACETAMINOPHEN 1 TABLET: 10; 325 TABLET ORAL at 08:06

## 2020-06-24 RX ADMIN — HEPARIN SODIUM 5000 UNITS: 5000 INJECTION INTRAVENOUS; SUBCUTANEOUS at 05:06

## 2020-06-24 RX ADMIN — FLUTICASONE FUROATE AND VILANTEROL TRIFENATATE 1 PUFF: 100; 25 POWDER RESPIRATORY (INHALATION) at 08:06

## 2020-06-24 RX ADMIN — FAMOTIDINE 20 MG: 20 TABLET ORAL at 08:06

## 2020-06-24 RX ADMIN — TIOTROPIUM BROMIDE 18 MCG: 18 CAPSULE ORAL; RESPIRATORY (INHALATION) at 08:06

## 2020-06-24 RX ADMIN — OXYCODONE HYDROCHLORIDE AND ACETAMINOPHEN 1 TABLET: 10; 325 TABLET ORAL at 03:06

## 2020-06-24 RX ADMIN — POLYETHYLENE GLYCOL 3350 17 G: 17 POWDER, FOR SOLUTION ORAL at 08:06

## 2020-06-24 RX ADMIN — DOCUSATE SODIUM 50 MG AND SENNOSIDES 8.6 MG 1 TABLET: 8.6; 5 TABLET, FILM COATED ORAL at 08:06

## 2020-06-24 RX ADMIN — AMLODIPINE BESYLATE 5 MG: 5 TABLET ORAL at 08:06

## 2020-06-24 NOTE — SUBJECTIVE & OBJECTIVE
"Interval History: POD 2 L2-L5 MIV TLIF. GARRETT. AFVSS. Patient reports back pain much improved from yesterday morning, still endorses "tightness," but denies any new weakness, numbness/tingling, bladder/bowel dysfunction. Patient reports voiding without issues since magana removed yesterday. Reports passing flatus. Dressing was removed from incision and incision is C/D/I without erythema, swelling, drainage or other signs of infection, edges are well approximated. Patient feels stable to discharge home. He is medically stable for discharge. I personally discussed post operative wound care and weight lifting restrictions with the patient and his sister, Cole, who will be patient's caregiver. The patient will follow up with the neurosurgery clinic in 2 weeks for incision check, he is to call our clinic at any point during this interim with any questions/concerns or new or worsening symptoms. The patient and his sister both expressed good understanding and agreed to the above plan. All questions were answered.    Medications:  Continuous Infusions:  Scheduled Meds:   amLODIPine  5 mg Oral Daily    famotidine  20 mg Oral BID    fluticasone furoate-vilanteroL  1 puff Inhalation Daily    heparin (porcine)  5,000 Units Subcutaneous Q8H    methocarbamoL  1,000 mg Oral QID    mupirocin   Nasal BID    polyethylene glycol  17 g Oral Daily    senna-docusate 8.6-50 mg  1 tablet Oral Daily    tiotropium  1 capsule Inhalation Daily     PRN Meds:acetaminophen, albuterol sulfate, aluminum-magnesium hydroxide-simethicone, labetaloL, ondansetron, oxyCODONE-acetaminophen, oxyCODONE-acetaminophen, promethazine (PHENERGAN) IVPB     Review of Systems  Objective:     Weight: 86.2 kg (190 lb 0.6 oz)  Body mass index is 25.77 kg/m².  Vital Signs (Most Recent):  Temp: 98.7 °F (37.1 °C) (06/24/20 0446)  Pulse: 74 (06/24/20 0656)  Resp: 18 (06/24/20 0446)  BP: (!) 168/73 (06/24/20 0446)  SpO2: (!) 94 % (06/24/20 0446) Vital Signs (24h " Range):  Temp:  [97.1 °F (36.2 °C)-98.7 °F (37.1 °C)] 98.7 °F (37.1 °C)  Pulse:  [74-87] 74  Resp:  [18-96] 18  SpO2:  [92 %-95 %] 94 %  BP: (145-187)/(69-78) 168/73                   Neurosurgery Physical Exam  General: well developed, well nourished, no distress.   Head: normocephalic, atraumatic  Neurologic: Alert and oriented. Thought content appropriate.  GCS: Motor: 6/Verbal: 5/Eyes: 4 GCS Total: 15  Mental Status: Awake, Alert, Oriented x 4  Language: No aphasia  Speech: No dysarthria  Cranial nerves: face symmetric, tongue midline, CN II-XII grossly intact.   Eyes: pupils equal, round, reactive to light with accommodation, EOMI.   Pulmonary: normal respirations, no signs of respiratory distress  Abdomen: soft, non-distended, not tender to palpation  Skin: Skin is warm, dry and intact.  Sensory: intact to light touch throughout     Motor Strength: No abnormal movements seen.      Strength   Deltoids Triceps Biceps Wrist Extension Wrist Flexion Hand    Upper: R 5/5 5/5 5/5 5/5 5/5 5/5     L 5/5 5/5 5/5 5/5 5/5 5/5       Iliopsoas Quadriceps Knee  Flexion Tibialis  anterior Gastro- cnemius EHL   Lower: R 5/5 5/5 5/5 5/5 5/5 5/5     L 5/5 5/5 5/5 5/5 5/5 5/5      Reflexes:   Dickson's: Negative.  Clonus: Negative.     Incision: Incision C/D/I with dissolvable sutures intact with wound edges well approximated. No erythema, drainage, swelling, tenderness or other gross sign of infection appreciated.      Significant Labs:  No results for input(s): GLU, NA, K, CL, CO2, BUN, CREATININE, CALCIUM, MG in the last 48 hours.  No results for input(s): WBC, HGB, HCT, PLT in the last 48 hours.  No results for input(s): LABPT, INR, APTT in the last 48 hours.  Microbiology Results (last 7 days)     ** No results found for the last 168 hours. **        All pertinent labs from the last 24 hours have been reviewed.    Significant Diagnostics:  I have reviewed and interpreted all pertinent imaging results/findings within the  past 24 hours.

## 2020-06-24 NOTE — PLAN OF CARE
Problem: Adult Inpatient Plan of Care  Goal: Absence of Hospital-Acquired Illness or Injury  Outcome: Ongoing, Progressing  Goal: Optimal Comfort and Wellbeing  Outcome: Ongoing, Progressing     Problem: Fall Injury Risk  Goal: Absence of Fall and Fall-Related Injury  Outcome: Ongoing, Progressing   No significant events noted throughout shift. VSS, see flowsheets. No signs of respiratory distress noted. O2 sats remained > 95% on room air. Patient is AAOx4. Patient denies any CP, SOB, palpitations, or dizziness. Fall/safety precautions maintained. Free from fall/injury. Skin remains intact with no signs of breakdown. POC updated and reviewed with pt, verbalized understanding. Patient was repositioned for comfort with call light within reach, bed in lowest position, wheels locked, with side rails up x 2. Patient instructed to call staff for mobility. SHLOMO.
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
## 2020-06-24 NOTE — PT/OT/SLP PROGRESS
Occupational Therapy      Patient Name:  Kingsley Holbrook   MRN:  [id number]    Patient not seen today secondary to patient unwilling to participate x 2 attempts due to pain. Will follow-up as scheduled.    JULIANA Long  6/24/2020

## 2020-06-24 NOTE — TELEPHONE ENCOUNTER
Called sister back. She has pt in car.    ----- Message from Mona Saldana sent at 6/24/2020 11:00 AM CDT -----  Regarding: patient advice  Contact: Cole (sister) @ [phone]  Pt's sister is asking to speak with someone in Dr. Ba's office to confirm discharge date of patient, says she needs to know to make arrangement due to the weather. Please call.
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
## 2020-06-24 NOTE — DISCHARGE INSTRUCTIONS
Neurosurgery Patient Information  -Return to work will be determined on an individual basis.  -No driving while taking narcotic pain medication  -Do not take any OTC products containing acetaminophen at the same time as you take your narcotic pain medication. Medications that may contain acetaminophen include but are not limited to: Excedrin and other headache medications, arthritis medications, cold and sinus medications, etc. Please review the list of active ingredients in any OTC medication prior to taking it.  -Do not take any Aspirin or Aspirin-containing products for 2 weeks after surgery.  -Do not take any Aleve, Naprosyn, Naproxen, Ibuprofen, Advil or any other nonsteroidal anti-inflammatory drug (NSAID) for 2 weeks after surgery.  -Do not take any herbal supplements for 2 weeks after surgery.   -Do not consume any alcoholic beverages until released by your neurosurgeon  -Do not perform any excessive bending over or leaning forward as this is a fall hazard.  -Do not perform any heavy lifting or lifting more than 5-10 lbs from the ground level as this is a fall hazard.  -Slowly increase your ambulation [walking] over the next 2 weeks as tolerated. The goal is to be walking 1-2 miles by the time of your 2 week post op appointment.   -Walk on paved surfaces only. It is okay to walk up and down stairs while holding onto a side rail.  -Please wear LSO brace when out of bed walking around.      Contact the Neurosurgery Office immediately if:  If you begin to notice any neurologic changes such as:           -Sudden onset of lethargy or sleepiness           -Sudden confusion, trouble speaking, or understanding            -Sudden trouble seeing in one or both eyes            -Sudden trouble walking, dizziness, loss of coordination            -Sudden severe headache with no known cause            -Sudden onset of numbness or weakness     Wound Care:  Keep your incision open to air. You may shower on the 2nd day after  your surgery. Keep the incision clean and dry at all times. . Do not allow the force of water to hit the incision. If the incision gets damp, gently pat it dry. Do not rub or scrub the incision. You cannot take a bath/swim/submerge the incision until 8 weeks after surgery.    The incision does not need to be cleaned with any water, soap, alcohol, peroxide, or other substance.    Apply Bacitracin ointment (over the counter) to incision twice daily.    Call your doctor or go to the Emergency Room for any signs of infection including: increased redness, drainage, pain or fever (temperature greater than or equal to 101.4).       Miscellaneous:  -Follow up with neurosurgery clinic in 2 weeks for a wound check. Appointment will be mailed to you.        [facility] Neurosurgery Office: [phone]              [facility] Neurosurgery Office: [phone]     [facility] Neurosurgery Office: [phone]

## 2020-06-24 NOTE — NURSING
Patient discharged home with discharge instructions, personal belongings and informed to follow up with PCP and other services needed and to contact the hospital with any questions or concerns pt/family may havea

## 2020-06-24 NOTE — PLAN OF CARE
06/24/20 1147   Post-Acute Status   Post-Acute Authorization Home Health   Home Health Status Set-up Complete       Pt accepted  by Bj SWARTZ.  SW in contact with CM and Medical staff. Will continue to follow and offer support as needed.     Jovani Martinez LMSW  [facility]   Ext. [phone]

## 2020-06-25 ENCOUNTER — TELEPHONE (OUTPATIENT)
Dept: NEUROSURGERY | Facility: CLINIC | Age: 70
End: 2020-06-25

## 2020-06-25 PROCEDURE — G0180 PR HOME HEALTH MD CERTIFICATION: ICD-10-PCS | Mod: ,,, | Performed by: NEUROLOGICAL SURGERY

## 2020-06-25 PROCEDURE — G0180 MD CERTIFICATION HHA PATIENT: HCPCS | Mod: ,,, | Performed by: NEUROLOGICAL SURGERY

## 2020-06-25 NOTE — TELEPHONE ENCOUNTER
Called pt back and told him NOT to take ibuprophen or Aleve, Mobic, Celebrex, etc. Explained rationale.    Discussed cutting the pills in halves or more and add a tylenol.    Reviewed the need a-biotic ointment on incision and the importance of walking and foot pumps to avoid blood clots. Pt v/u.    ----- Message from Lacey De La Rosa sent at 6/25/2020  4:33 PM CDT -----  Contact: self @ [phone]  Pt says he had surgery this past Monday and was discharged from the hospital yesterday.  Pt says he was given a prescription for oxycodone 7.5/325 but it is too strong for him.  Pt says he has ibuprofen 800 mg.  He is calling to see if he can that instead.  Pls call.

## 2020-06-29 DIAGNOSIS — Z98.1 STATUS POST LUMBAR SPINAL FUSION: Primary | ICD-10-CM

## 2020-06-29 RX ORDER — HYDROCODONE BITARTRATE AND ACETAMINOPHEN 7.5; 325 MG/1; MG/1
1 TABLET ORAL EVERY 8 HOURS PRN
Qty: 30 TABLET | Refills: 0 | Status: SHIPPED | OUTPATIENT
Start: 2020-06-29 | End: 2020-07-10 | Stop reason: ALTCHOICE

## 2020-06-29 NOTE — TELEPHONE ENCOUNTER
----- Message from Mona Saldana sent at 6/29/2020 10:50 AM CDT -----  Regarding: refill  Contact: pt @ [phone]  Rx Refill/Request     Is this a Refill or New Rx:  refill    Rx Name and Strength:  oxyCODONE-acetaminophen (PERCOCET) 7.5-325 mg per tablet    Preferred Pharmacy with phone number:     [id number] [address] [facility] [address]  [address] [facility] [address]  Phone: [phone] Fax: [phone]      Communication Preference:pt @ [phone]  Additional Information:

## 2020-06-29 NOTE — TELEPHONE ENCOUNTER
Called pt. He tried not taking opioids and pain got too bad. Reviewed . He was only given 28 of the 60 ordered.    Told the pt we Rx'd something similar but should not make him as sleepy (Norco 7.5 vs Percocet 7.5). He will send his sister to p/u from [facility]. Recommended he get it filled at [facility].    Also, his 2 MRI discs were sent back via mail.    ----- Message from Lacey De La Rosa sent at 6/29/2020 11:07 AM CDT -----  Contact: self @ [phone]  Pt says he dropped off his MRI on disc.  Pt would like to have them mailed back to him.

## 2020-07-03 RX ORDER — OXYCODONE AND ACETAMINOPHEN 7.5; 325 MG/1; MG/1
1 TABLET ORAL EVERY 6 HOURS PRN
Qty: 28 TABLET | Refills: 0 | Status: SHIPPED | OUTPATIENT
Start: 2020-07-03 | End: 2020-07-10

## 2020-07-05 ENCOUNTER — NURSE TRIAGE (OUTPATIENT)
Dept: ADMINISTRATIVE | Facility: CLINIC | Age: 70
End: 2020-07-05

## 2020-07-05 NOTE — TELEPHONE ENCOUNTER
Post procedure symptom tacking reach out day 13. Pt reports he is asymptomatic. OOC number provided for new symptoms/concerns.     Reason for Disposition   General information question, no triage required and triager able to answer question    Protocols used: INFORMATION ONLY CALL - NO TRIAGE-A-

## 2020-07-06 ENCOUNTER — TELEPHONE (OUTPATIENT)
Dept: NEUROSURGERY | Facility: CLINIC | Age: 70
End: 2020-07-06

## 2020-07-06 NOTE — TELEPHONE ENCOUNTER
Pt called in, trouble getting a ride to P/O appt today. Called. Discussed Virtual. He will send photos of his incisions.    I emailed him Post-op info as well as teaching info on TLIF.    States he has been taking the oxycodone q 4 hrs. Counseled pt to not take pain meds per clock but rather as needed. He v/u and desire to not take more than he should.

## 2020-07-09 ENCOUNTER — EXTERNAL HOME HEALTH (OUTPATIENT)
Dept: HOME HEALTH SERVICES | Facility: HOSPITAL | Age: 70
End: 2020-07-09
Payer: COMMERCIAL

## 2020-07-10 ENCOUNTER — CLINICAL SUPPORT (OUTPATIENT)
Dept: NEUROSURGERY | Facility: CLINIC | Age: 70
End: 2020-07-10
Payer: COMMERCIAL

## 2020-07-10 VITALS — HEART RATE: 78 BPM | DIASTOLIC BLOOD PRESSURE: 86 MMHG | TEMPERATURE: 98 F | SYSTOLIC BLOOD PRESSURE: 144 MMHG

## 2020-07-10 PROCEDURE — 99999 PR PBB SHADOW E&M-EST. PATIENT-LVL III: CPT | Mod: PBBFAC,,,

## 2020-07-10 PROCEDURE — 99999 PR PBB SHADOW E&M-EST. PATIENT-LVL III: ICD-10-PCS | Mod: PBBFAC,,,

## 2020-07-10 RX ORDER — OXYCODONE AND ACETAMINOPHEN 5; 325 MG/1; MG/1
1 TABLET ORAL EVERY 8 HOURS PRN
Qty: 45 TABLET | Refills: 0 | Status: SHIPPED | OUTPATIENT
Start: 2020-07-10 | End: 2020-08-05 | Stop reason: SDUPTHER

## 2020-07-10 NOTE — PROGRESS NOTES
Wound Check - Progress Note PT GIVEN RX FOR PERCOCET 5-325     Mr. Kingsley Holbrook is a very pleasant 70 year old male s/p 3-Level MIS TLIF on 6/22/2020 by Dr. Ba. (For complete diagnosis and procedure, see OP note.) He presents to clinic today for his 2 wk post-op visit. NAD. Pt in [facility] w/c but states he walks well with his walker or cane. Denies any fever, chills, night sweats or N/V.     Pt states his pre-op pain and symptoms are gone and is only experiencing operative pain. This is controlled with Percocet 7.5 - 1 am, ½ midday, 1 @ night. Discussed the importance of weaning from opioids. Pt states he is motivated. Discussed methods.    Pt requested refill. Pt never picked up Hydrocodone Rx left at [facility] 6/29/20 (confirmed with pharmacist today). Gave Rx for oxycodone 5-325. Explained this would need to last him. He v/u.    Incisions are clean, dry and intact with no signs of swelling or purulent drainage.     Pt endorses compliance with brace when up. Reviewed brace rationale.          The patient was given instructions in writing which include:   keep the area open to air,    may shower and get incision wet, wash gently, pat dry.   RTC in 4 weeks    Encouraged patient to call if they have any questions or concerns prior to next follow up appt.    Naz Jimenez RN  Neurosurgery

## 2020-08-05 ENCOUNTER — HOSPITAL ENCOUNTER (OUTPATIENT)
Dept: RADIOLOGY | Facility: HOSPITAL | Age: 70
Discharge: HOME | End: 2020-08-05
Attending: NEUROLOGICAL SURGERY
Payer: COMMERCIAL

## 2020-08-05 ENCOUNTER — OFFICE VISIT (OUTPATIENT)
Dept: NEUROSURGERY | Facility: CLINIC | Age: 70
End: 2020-08-05
Payer: COMMERCIAL

## 2020-08-05 VITALS
HEART RATE: 72 BPM | SYSTOLIC BLOOD PRESSURE: 159 MMHG | DIASTOLIC BLOOD PRESSURE: 84 MMHG | WEIGHT: 181.69 LBS | BODY MASS INDEX: 24.64 KG/M2 | TEMPERATURE: 98 F

## 2020-08-05 DIAGNOSIS — M48.062 SPINAL STENOSIS OF LUMBAR REGION WITH NEUROGENIC CLAUDICATION: ICD-10-CM

## 2020-08-05 DIAGNOSIS — M48.062 LUMBAR STENOSIS WITH NEUROGENIC CLAUDICATION: ICD-10-CM

## 2020-08-05 DIAGNOSIS — M48.062 LUMBAR STENOSIS WITH NEUROGENIC CLAUDICATION: Primary | ICD-10-CM

## 2020-08-05 PROCEDURE — 72100 X-RAY EXAM L-S SPINE 2/3 VWS: CPT | Mod: 26,,, | Performed by: RADIOLOGY

## 2020-08-05 PROCEDURE — 72100 X-RAY EXAM L-S SPINE 2/3 VWS: CPT | Mod: TC

## 2020-08-05 PROCEDURE — 99999 PR PBB SHADOW E&M-EST. PATIENT-LVL III: CPT | Mod: PBBFAC,,, | Performed by: NEUROLOGICAL SURGERY

## 2020-08-05 PROCEDURE — 99024 POSTOP FOLLOW-UP VISIT: CPT | Mod: S$GLB,,, | Performed by: NEUROLOGICAL SURGERY

## 2020-08-05 PROCEDURE — 99024 PR POST-OP FOLLOW-UP VISIT: ICD-10-PCS | Mod: S$GLB,,, | Performed by: NEUROLOGICAL SURGERY

## 2020-08-05 PROCEDURE — 72100 XR LUMBAR SPINE AP AND LATERAL: ICD-10-PCS | Mod: 26,,, | Performed by: RADIOLOGY

## 2020-08-05 PROCEDURE — 99999 PR PBB SHADOW E&M-EST. PATIENT-LVL III: ICD-10-PCS | Mod: PBBFAC,,, | Performed by: NEUROLOGICAL SURGERY

## 2020-08-05 RX ORDER — OXYCODONE AND ACETAMINOPHEN 5; 325 MG/1; MG/1
1 TABLET ORAL EVERY 8 HOURS PRN
Qty: 45 TABLET | Refills: 0 | Status: SHIPPED | OUTPATIENT
Start: 2020-08-05 | End: 2020-09-30
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
## 2020-08-05 NOTE — PATIENT INSTRUCTIONS
Will review his XR of the lumbar spine and contact the pt with the results if there is anything disconcerting.    Pt prescribed Percocet for pain control.     I will schedule the patient for 6 month follow up with CT of lumbar spine.     Pt encouraged to walk more in the interim and avoid lifting more than 30 lbs at a time.      Pt instructed to air out the incision site and allow the tissue to slough off.

## 2020-08-17 RX ORDER — METHOCARBAMOL 750 MG/1
750 TABLET, FILM COATED ORAL 3 TIMES DAILY
Qty: 42 TABLET | Refills: 0 | Status: SHIPPED | OUTPATIENT
Start: 2020-08-17 | End: 2020-09-10 | Stop reason: SDUPTHER

## 2020-08-17 NOTE — TELEPHONE ENCOUNTER
----- Message from Garth Jimenez sent at 8/17/2020 12:45 PM CDT -----  Contact: Pt @[phone]  Rx Refill/Request     Is this a Refill or New Rx:  yes   Rx Name and Strength:  methocarbamoL (ROBAXIN) 750 MG Tab  Preferred Pharmacy with phone number:     Patient is out of medication     [id number] [address] [facility] [address]  [address] [facility] [address]  Phone: [phone] Fax: [phone]

## 2020-08-18 ENCOUNTER — TELEPHONE (OUTPATIENT)
Dept: NEUROSURGERY | Facility: CLINIC | Age: 70
End: 2020-08-18

## 2020-08-28 ENCOUNTER — HOSPITAL ENCOUNTER (EMERGENCY)
Facility: HOSPITAL | Age: 70
Discharge: HOME | End: 2020-08-28
Attending: EMERGENCY MEDICINE
Payer: COMMERCIAL

## 2020-08-28 ENCOUNTER — HOSPITAL ENCOUNTER (OUTPATIENT)
Dept: RADIOLOGY | Facility: HOSPITAL | Age: 70
Discharge: HOME | End: 2020-08-28
Attending: PHYSICIAN ASSISTANT
Payer: COMMERCIAL

## 2020-08-28 ENCOUNTER — TELEPHONE (OUTPATIENT)
Dept: NEUROSURGERY | Facility: CLINIC | Age: 70
End: 2020-08-28

## 2020-08-28 VITALS
RESPIRATION RATE: 18 BRPM | WEIGHT: 185 LBS | BODY MASS INDEX: 25.06 KG/M2 | DIASTOLIC BLOOD PRESSURE: 76 MMHG | SYSTOLIC BLOOD PRESSURE: 147 MMHG | TEMPERATURE: 98 F | HEIGHT: 72 IN | OXYGEN SATURATION: 97 % | HEART RATE: 88 BPM

## 2020-08-28 DIAGNOSIS — I99.8 ISCHEMIA OF LEFT LOWER EXTREMITY: ICD-10-CM

## 2020-08-28 DIAGNOSIS — M79.662 TENDERNESS OF LEFT CALF: Primary | ICD-10-CM

## 2020-08-28 DIAGNOSIS — M79.662 TENDERNESS OF LEFT CALF: ICD-10-CM

## 2020-08-28 DIAGNOSIS — I70.209 OCCLUSION OF ARTERY OF LEG: Primary | ICD-10-CM

## 2020-08-28 DIAGNOSIS — M79.605 LEG PAIN, LEFT: ICD-10-CM

## 2020-08-28 LAB
ALBUMIN SERPL BCP-MCNC: 4 G/DL (ref 3.5–5.2)
ALP SERPL-CCNC: 106 U/L (ref 55–135)
ALT SERPL W/O P-5'-P-CCNC: 25 U/L (ref 10–44)
ANION GAP SERPL CALC-SCNC: 10 MMOL/L (ref 8–16)
APTT BLDCRRT: 31 SEC (ref 21–32)
AST SERPL-CCNC: 24 U/L (ref 10–40)
BASOPHILS # BLD AUTO: 0.05 K/UL (ref 0–0.2)
BASOPHILS NFR BLD: 0.6 % (ref 0–1.9)
BILIRUB SERPL-MCNC: 0.3 MG/DL (ref 0.1–1)
BUN SERPL-MCNC: 6 MG/DL (ref 6–30)
BUN SERPL-MCNC: 7 MG/DL (ref 8–23)
CALCIUM SERPL-MCNC: 9.5 MG/DL (ref 8.7–10.5)
CHLORIDE SERPL-SCNC: 101 MMOL/L (ref 95–110)
CHLORIDE SERPL-SCNC: 106 MMOL/L (ref 95–110)
CO2 SERPL-SCNC: 25 MMOL/L (ref 23–29)
CREAT SERPL-MCNC: 0.8 MG/DL (ref 0.5–1.4)
CREAT SERPL-MCNC: 0.9 MG/DL (ref 0.5–1.4)
DIFFERENTIAL METHOD: ABNORMAL
EOSINOPHIL # BLD AUTO: 0.3 K/UL (ref 0–0.5)
EOSINOPHIL NFR BLD: 3.7 % (ref 0–8)
ERYTHROCYTE [DISTWIDTH] IN BLOOD BY AUTOMATED COUNT: 14.2 % (ref 11.5–14.5)
EST. GFR  (AFRICAN AMERICAN): >60 ML/MIN/1.73 M^2
EST. GFR  (NON AFRICAN AMERICAN): >60 ML/MIN/1.73 M^2
GLUCOSE SERPL-MCNC: 95 MG/DL (ref 70–110)
GLUCOSE SERPL-MCNC: 97 MG/DL (ref 70–110)
HCT VFR BLD AUTO: 45 % (ref 40–54)
HCT VFR BLD CALC: 43 %PCV (ref 36–54)
HGB BLD-MCNC: 14.3 G/DL (ref 14–18)
IMM GRANULOCYTES # BLD AUTO: 0.03 K/UL (ref 0–0.04)
IMM GRANULOCYTES NFR BLD AUTO: 0.4 % (ref 0–0.5)
INR PPP: 0.9 (ref 0.8–1.2)
LYMPHOCYTES # BLD AUTO: 1.6 K/UL (ref 1–4.8)
LYMPHOCYTES NFR BLD: 20.8 % (ref 18–48)
MCH RBC QN AUTO: 29.3 PG (ref 27–31)
MCHC RBC AUTO-ENTMCNC: 31.8 G/DL (ref 32–36)
MCV RBC AUTO: 92 FL (ref 82–98)
MONOCYTES # BLD AUTO: 0.9 K/UL (ref 0.3–1)
MONOCYTES NFR BLD: 10.9 % (ref 4–15)
NEUTROPHILS # BLD AUTO: 5 K/UL (ref 1.8–7.7)
NEUTROPHILS NFR BLD: 63.6 % (ref 38–73)
NRBC BLD-RTO: 0 /100 WBC
PLATELET # BLD AUTO: 273 K/UL (ref 150–350)
PMV BLD AUTO: 9.4 FL (ref 9.2–12.9)
POC IONIZED CALCIUM: 1.18 MMOL/L (ref 1.06–1.42)
POC TCO2 (MEASURED): 10 MMOL/L (ref 23–29)
POTASSIUM BLD-SCNC: 4.1 MMOL/L (ref 3.5–5.1)
POTASSIUM SERPL-SCNC: 4.3 MMOL/L (ref 3.5–5.1)
PROT SERPL-MCNC: 7.3 G/DL (ref 6–8.4)
PROTHROMBIN TIME: 10.5 SEC (ref 9–12.5)
RBC # BLD AUTO: 4.88 M/UL (ref 4.6–6.2)
SAMPLE: ABNORMAL
SARS-COV-2 RDRP RESP QL NAA+PROBE: NEGATIVE
SODIUM BLD-SCNC: 138 MMOL/L (ref 136–145)
SODIUM SERPL-SCNC: 141 MMOL/L (ref 136–145)
WBC # BLD AUTO: 7.8 K/UL (ref 3.9–12.7)

## 2020-08-28 PROCEDURE — 93005 ELECTROCARDIOGRAM TRACING: CPT

## 2020-08-28 PROCEDURE — 80047 BASIC METABLC PNL IONIZED CA: CPT

## 2020-08-28 PROCEDURE — 93971 US LOWER EXTREMITY VEINS LEFT: ICD-10-PCS | Mod: 26,LT,, | Performed by: RADIOLOGY

## 2020-08-28 PROCEDURE — 85610 PROTHROMBIN TIME: CPT

## 2020-08-28 PROCEDURE — 93971 EXTREMITY STUDY: CPT | Mod: TC,LT

## 2020-08-28 PROCEDURE — 82330 ASSAY OF CALCIUM: CPT

## 2020-08-28 PROCEDURE — 93010 EKG 12-LEAD: ICD-10-PCS | Mod: ,,, | Performed by: INTERNAL MEDICINE

## 2020-08-28 PROCEDURE — 85730 THROMBOPLASTIN TIME PARTIAL: CPT

## 2020-08-28 PROCEDURE — 99284 PR EMERGENCY DEPT VISIT,LEVEL IV: ICD-10-PCS | Mod: ,,, | Performed by: EMERGENCY MEDICINE

## 2020-08-28 PROCEDURE — 99284 EMERGENCY DEPT VISIT MOD MDM: CPT | Mod: ,,, | Performed by: EMERGENCY MEDICINE

## 2020-08-28 PROCEDURE — 93010 ELECTROCARDIOGRAM REPORT: CPT | Mod: ,,, | Performed by: INTERNAL MEDICINE

## 2020-08-28 PROCEDURE — 25000003 PHARM REV CODE 250: Performed by: EMERGENCY MEDICINE

## 2020-08-28 PROCEDURE — U0002 COVID-19 LAB TEST NON-CDC: HCPCS

## 2020-08-28 PROCEDURE — 85025 COMPLETE CBC W/AUTO DIFF WBC: CPT

## 2020-08-28 PROCEDURE — 93971 EXTREMITY STUDY: CPT | Mod: 26,LT,, | Performed by: RADIOLOGY

## 2020-08-28 PROCEDURE — 80053 COMPREHEN METABOLIC PANEL: CPT

## 2020-08-28 PROCEDURE — 99285 EMERGENCY DEPT VISIT HI MDM: CPT | Mod: 25

## 2020-08-28 RX ORDER — HYDROCODONE BITARTRATE AND ACETAMINOPHEN 5; 325 MG/1; MG/1
1 TABLET ORAL EVERY 8 HOURS PRN
Qty: 45 TABLET | Refills: 0 | Status: SHIPPED | OUTPATIENT
Start: 2020-08-28

## 2020-08-28 RX ORDER — ATORVASTATIN CALCIUM 40 MG/1
40 TABLET, FILM COATED ORAL DAILY
Qty: 60 TABLET | Refills: 0 | Status: SHIPPED | OUTPATIENT
Start: 2020-08-28 | End: 2020-10-28

## 2020-08-28 RX ORDER — CILOSTAZOL 50 MG/1
50 TABLET ORAL 2 TIMES DAILY
Qty: 120 TABLET | Refills: 0 | Status: ON HOLD | OUTPATIENT
Start: 2020-08-28 | End: 2020-10-06 | Stop reason: HOSPADM

## 2020-08-28 RX ORDER — AMLODIPINE BESYLATE 5 MG/1
5 TABLET ORAL
Status: COMPLETED | OUTPATIENT
Start: 2020-08-28 | End: 2020-08-28

## 2020-08-28 RX ADMIN — AMLODIPINE BESYLATE 5 MG: 5 TABLET ORAL at 09:08

## 2020-08-28 NOTE — TELEPHONE ENCOUNTER
"Patient called requesting more pain medication. Stating he has been taking percocet 5-325 mg q8h around the clock. State he does need it for his pain. Reports preoperative lower extremity pain resolved, however now stating his lower back pain is much worse. We discussed that he could take OTC tylenol for pain. I will give him 1 more rx for norco 5-325 to be taken AS NEEDED for pain [id number]. I will also need to refer him to pain management. At the end of our conversation, patient stated he feels he has a "fever in his back" stating his back feels hot and swollen. He reports his incision is closed and there is no drainage from wound. When patient comes to  Rx I would like to take a look at his incision. Advised patient our clinic closes at 4 pm today and he will need to be here prior to that. Patient verbalized agreement.   "

## 2020-08-28 NOTE — PROGRESS NOTES
Update: Patient found to have left arterial thrombus, Radiologist contacted me and is sending the patient immediately to the ED.         Upon patient arrival to clinic to  Rx, patient reported he was concerned because his left calf was tender to touch and felt like a pulled muscle. The calf was indeed tender to palpation. Will send patient to radiology directly from clinic to obtain an ultrasound of the left lower extremity to rule out a DVT.

## 2020-08-28 NOTE — ED NOTES
Patient sent to ED by Dr. Ba for concerns of DVT in left lower leg; c/o left calf pain that worsens upon walking.  Had recent spinal fusion about a month ago.  Arrived with back brace using a cane.  Denies SOB or chest pain. Reports decreased sensation in left leg.

## 2020-08-29 NOTE — HPI
The patient is a 70-year-old male with PMH of possible COPD, tobacco abuse who presents with a 2 week history of left lower extremity claudication type symptoms.  He is an everyday smoker, smoking about 1/2 pack per day.  He underwent lumbar surgery 2 months ago with great relief of his sciatic type nerve pain.  He had previously walks as far as he like without any issues, however roughly 2 weeks ago he began having pain in the anterior shin when walking.  This progressed to calf pain and cramping when walking short distances.  It is relieved by rest.  He has no pain at rest.  He has no tissue loss or ulcers.  He is unsure if he has ever been told that he has peripheral vascular disease in the past.  He denies any other surgeries or procedures.  He was previously on preventative 81mg aspirin, but this was stopped for surgery and he is yet to resume it.  He presents to the emergency room for evaluation at the behest of his spine surgeon.  They thought he might have a DVT.  He had an ultrasound which did not reveal DVT but did reveal popliteal and AT occlusion on the left.  He denies any swelling of the lower extremity.  He has no history or family history of blood clots.

## 2020-08-29 NOTE — SUBJECTIVE & OBJECTIVE
(Not in a hospital admission)      Review of patient's allergies indicates:  No Known Allergies    Past Medical History:   Diagnosis Date    Back pain     COPD (chronic obstructive pulmonary disease)     Hypertension      Past Surgical History:   Procedure Laterality Date    COLONOSCOPY      MINIMALLY INVASIVE TRANSFORAMINAL LUMBAR INTERBODY FUSION (TLIF) Left 6/22/2020    Procedure: FUSION, SPINE, LUMBAR, TLIF, MINIMALLY INVASIVE, L2-5;  Surgeon: Terry Ba MD;  Location: [facility] OR [address] [place] [address];  Service: Neurosurgery;  Laterality: Left;  TOR 1  ASA 1  T&S 2 UNITS  EMG/SEP/MEP  C-ARM  SMITA TABLE 4 POSTER  PRONE HEADREST     Family History     Problem Relation (Age of Onset)    Heart disease Father (53)        Tobacco Use    Smoking status: Current Every Day Smoker    Smokeless tobacco: Never Used   Substance and Sexual Activity    Alcohol use: Yes     Comment: 1-2 drinks a week     Drug use: Yes     Types: Marijuana    Sexual activity: Not on file     Review of Systems   Constitutional: Positive for activity change.   HENT: Negative for congestion.    Respiratory: Negative for apnea, cough and shortness of breath.    Cardiovascular: Negative for chest pain and leg swelling.   Gastrointestinal: Negative for abdominal distention, abdominal pain, constipation and nausea.   Musculoskeletal: Positive for myalgias.   Neurological: Negative for syncope, speech difficulty and numbness.     Objective:     Vital Signs (Most Recent):  Temp: 97.7 °F (36.5 °C) (08/28/20 1759)  Pulse: 68 (08/28/20 1905)  Resp: 18 (08/28/20 1759)  BP: (!) 214/100 (08/28/20 1905)  SpO2: 98 % (08/28/20 1905) Vital Signs (24h Range):  Temp:  [97.7 °F (36.5 °C)] 97.7 °F (36.5 °C)  Pulse:  [68-81] 68  Resp:  [18] 18  SpO2:  [97 %-98 %] 98 %  BP: (193-214)/() 214/100     Weight: 83.9 kg (185 lb)  Body mass index is 25.09 kg/m².    Physical Exam  HENT:      Head: Normocephalic.      Mouth/Throat:      Mouth: Mucous membranes are moist.    Eyes:      Extraocular Movements: Extraocular movements intact.      Pupils: Pupils are equal, round, and reactive to light.   Cardiovascular:      Rate and Rhythm: Normal rate and regular rhythm.      Comments: Palpable right PT pulse,  Doppler DP signal.  Weak PT signal on left, can't find DP signal.  Pulmonary:      Effort: Pulmonary effort is normal. No respiratory distress.      Breath sounds: No wheezing or rhonchi.   Abdominal:      General: Abdomen is flat.      Palpations: Abdomen is soft.   Skin:     General: Skin is warm.      Capillary Refill: Capillary refill takes less than 2 seconds.   Neurological:      General: No focal deficit present.      Mental Status: He is alert and oriented to person, place, and time.         Significant Labs:  BMP:   Recent Labs   Lab 08/28/20 1858   GLU 95      K 4.3      CO2 25   BUN 7*   CREATININE 0.9   CALCIUM 9.5     CBC:   Recent Labs   Lab 08/28/20 1858 08/28/20  1909   WBC 7.80  --    RBC 4.88  --    HGB 14.3  --    HCT 45.0 43     --    MCV 92  --    MCH 29.3  --    MCHC 31.8*  --        Significant Diagnostics:

## 2020-08-29 NOTE — DISCHARGE INSTRUCTIONS
Restart Aspirin 81mg every day    Our goal in the emergency department is to always give you outstanding care and exceptional service. You may receive a survey by mail or e-mail in the next week regarding your experience in our ED. We would greatly appreciate your completing and returning the survey. Your feedback provides us with a way to recognize our staff who give very good care and it helps us learn how to improve when your experience was below our aspiration of excellence.

## 2020-08-29 NOTE — ED NOTES
Patient turned over me by Dr. Aguirre.  Pending ultrasound and vascular surgery eval.  Discussed with radiology who read the ultrasound as multiple blockages to the arteries of the left leg.  Discussed with vascular surgery who also reviewed.  Will discharge with aspirin, Lipitor, and Cilostazol.  Vascular will follow-up in clinic.  Discussed disposition with patient at bedside.  All questions answered.  He acknowledges understanding.     Tao Wang MD  08/28/20 3762

## 2020-08-29 NOTE — ASSESSMENT & PLAN NOTE
70 year old male with tobacco abuse and PVD.   Currently no rest pain and foot is not threatened.  Will have him follow up in clinic for further workup and possible intervention.    -Secondary prevention of atherosclerotic risk factors: Goal BP <140/90, goal LDL <100  -ASA 81mg daily for prevention of MI, stroke, and acute limb ischemia  -High intensity statin (atorvastatin 40mg or rosuvastatin 20mg)  -Cilostazol 50mg BID if able to tolerate  -Encourage hydration  -Encourage walking and exercise program to encourage collateral vessel development  -Follow up with Dr. Howard in vascular clinic in 2 weeks

## 2020-08-29 NOTE — H&P
[facility]  Vascular Surgery  History and Physical     Patient Name: Kingsley Holbrook  MRN: [id number]  Admission Date: 8/28/2020  Code Status: No Order   Attending Physician: Kedar Howard MD  Primary Care Physician: Alona Davidson NP    Subjective:     Chief Complaint/Reason for Admission: Left foot pain    HPI:  The patient is a 70-year-old male with PMH of possible COPD, tobacco abuse who presents with a 2 week history of left lower extremity claudication type symptoms.  He is an everyday smoker, smoking about 1/2 pack per day.  He underwent lumbar surgery 2 months ago with great relief of his sciatic type nerve pain.  He had previously walks as far as he like without any issues, however roughly 2 weeks ago he began having pain in the anterior shin when walking.  This progressed to calf pain and cramping when walking short distances.  It is relieved by rest.  He has no pain at rest.  He has no tissue loss or ulcers.  He is unsure if he has ever been told that he has peripheral vascular disease in the past.  He denies any other surgeries or procedures.  He was previously on preventative 81mg aspirin, but this was stopped for surgery and he is yet to resume it.  He presents to the emergency room for evaluation at the behest of his spine surgeon.  They thought he might have a DVT.  He had an ultrasound which did not reveal DVT but did reveal popliteal and AT occlusion on the left.  He denies any swelling of the lower extremity.  He has no history or family history of blood clots.    (Not in a hospital admission)      Review of patient's allergies indicates:  No Known Allergies    Past Medical History:   Diagnosis Date    Back pain     COPD (chronic obstructive pulmonary disease)     Hypertension      Past Surgical History:   Procedure Laterality Date    COLONOSCOPY      MINIMALLY INVASIVE TRANSFORAMINAL LUMBAR INTERBODY FUSION (TLIF) Left 6/22/2020    Procedure: FUSION, SPINE, LUMBAR, TLIF,  MINIMALLY INVASIVE, L2-5;  Surgeon: Terry Ba MD;  Location: [facility] OR [address] [place] [address];  Service: Neurosurgery;  Laterality: Left;  TOR 1  ASA 1  T&S 2 UNITS  EMG/SEP/MEP  C-ARM  SMITA TABLE 4 POSTER  PRONE HEADREST     Family History     Problem Relation (Age of Onset)    Heart disease Father (53)        Tobacco Use    Smoking status: Current Every Day Smoker    Smokeless tobacco: Never Used   Substance and Sexual Activity    Alcohol use: Yes     Comment: 1-2 drinks a week     Drug use: Yes     Types: Marijuana    Sexual activity: Not on file     Review of Systems   Constitutional: Positive for activity change.   HENT: Negative for congestion.    Respiratory: Negative for apnea, cough and shortness of breath.    Cardiovascular: Negative for chest pain and leg swelling.   Gastrointestinal: Negative for abdominal distention, abdominal pain, constipation and nausea.   Musculoskeletal: Positive for myalgias.   Neurological: Negative for syncope, speech difficulty and numbness.     Objective:     Vital Signs (Most Recent):  Temp: 97.7 °F (36.5 °C) (08/28/20 1759)  Pulse: 68 (08/28/20 1905)  Resp: 18 (08/28/20 1759)  BP: (!) 214/100 (08/28/20 1905)  SpO2: 98 % (08/28/20 1905) Vital Signs (24h Range):  Temp:  [97.7 °F (36.5 °C)] 97.7 °F (36.5 °C)  Pulse:  [68-81] 68  Resp:  [18] 18  SpO2:  [97 %-98 %] 98 %  BP: (193-214)/() 214/100     Weight: 83.9 kg (185 lb)  Body mass index is 25.09 kg/m².    Physical Exam  HENT:      Head: Normocephalic.      Mouth/Throat:      Mouth: Mucous membranes are moist.   Eyes:      Extraocular Movements: Extraocular movements intact.      Pupils: Pupils are equal, round, and reactive to light.   Cardiovascular:      Rate and Rhythm: Normal rate and regular rhythm.      Comments: Palpable right PT pulse,  Doppler DP signal.  Weak PT signal on left, can't find DP signal.  Pulmonary:      Effort: Pulmonary effort is normal. No respiratory distress.      Breath sounds: No wheezing or  rhonchi.   Abdominal:      General: Abdomen is flat.      Palpations: Abdomen is soft.   Skin:     General: Skin is warm.      Capillary Refill: Capillary refill takes less than 2 seconds.   Neurological:      General: No focal deficit present.      Mental Status: He is alert and oriented to person, place, and time.         Significant Labs:  BMP:   Recent Labs   Lab 08/28/20 1858   GLU 95      K 4.3      CO2 25   BUN 7*   CREATININE 0.9   CALCIUM 9.5     CBC:   Recent Labs   Lab 08/28/20 1858 08/28/20  1909   WBC 7.80  --    RBC 4.88  --    HGB 14.3  --    HCT 45.0 43     --    MCV 92  --    MCH 29.3  --    MCHC 31.8*  --        Significant Diagnostics:    Prelim US shows distal SFA and popliteal occlusion with collateralization on the right, as well as AT occlusion  Assessment and Plan:     PVD (peripheral vascular disease)  70 year old male with tobacco abuse and PVD.   Currently no rest pain and foot is not threatened.  Will have him follow up in clinic for further workup and possible intervention.    -Secondary prevention of atherosclerotic risk factors: Goal BP <140/90, goal LDL <100  -ASA 81mg daily for prevention of MI, stroke, and acute limb ischemia  -High intensity statin (atorvastatin 40mg or rosuvastatin 20mg)  -Cilostazol 50mg BID if able to tolerate  -Encourage hydration  -Encourage walking and exercise program to encourage collateral vessel development  -Follow up with Dr. Howard in vascular clinic in 2 weeks            Fracisco Paul MD  Vascular Surgery  [facility]

## 2020-08-29 NOTE — CONSULTS
Please see H&P note (wrong note type) filed earlier this evening.    Seamus Paul MD  Vascular Fellow PGY6

## 2020-09-10 DIAGNOSIS — M48.062 LUMBAR STENOSIS WITH NEUROGENIC CLAUDICATION: ICD-10-CM

## 2020-09-10 DIAGNOSIS — Z98.1 S/P LUMBAR FUSION: Primary | ICD-10-CM

## 2020-09-10 DIAGNOSIS — M47.816 LUMBAR SPONDYLOSIS: ICD-10-CM

## 2020-09-10 RX ORDER — METHOCARBAMOL 750 MG/1
750 TABLET, FILM COATED ORAL 3 TIMES DAILY
Qty: 42 TABLET | Refills: 0 | Status: SHIPPED | OUTPATIENT
Start: 2020-09-10

## 2020-09-25 PROBLEM — I73.9 INTERMITTENT CLAUDICATION: Status: ACTIVE | Noted: 2020-09-25

## 2020-10-06 PROBLEM — I73.9 PERIPHERAL VASCULAR DISEASE, UNSPECIFIED: Status: ACTIVE | Noted: 2020-10-06

## 2020-12-02 ENCOUNTER — HOSPITAL ENCOUNTER (OUTPATIENT)
Dept: RADIOLOGY | Facility: HOSPITAL | Age: 70
Discharge: HOME | End: 2020-12-02
Attending: NEUROLOGICAL SURGERY
Payer: COMMERCIAL

## 2020-12-02 ENCOUNTER — OFFICE VISIT (OUTPATIENT)
Dept: NEUROSURGERY | Facility: CLINIC | Age: 70
End: 2020-12-02
Payer: COMMERCIAL

## 2020-12-02 VITALS
HEART RATE: 68 BPM | BODY MASS INDEX: 25.02 KG/M2 | DIASTOLIC BLOOD PRESSURE: 84 MMHG | HEIGHT: 72 IN | WEIGHT: 184.75 LBS | SYSTOLIC BLOOD PRESSURE: 169 MMHG

## 2020-12-02 DIAGNOSIS — M47.26 OSTEOARTHRITIS OF SPINE WITH RADICULOPATHY, LUMBAR REGION: ICD-10-CM

## 2020-12-02 DIAGNOSIS — Z98.1 S/P LUMBAR FUSION: ICD-10-CM

## 2020-12-02 DIAGNOSIS — M48.062 NEUROGENIC CLAUDICATION DUE TO LUMBAR SPINAL STENOSIS: ICD-10-CM

## 2020-12-02 DIAGNOSIS — M48.062 LUMBAR STENOSIS WITH NEUROGENIC CLAUDICATION: Primary | ICD-10-CM

## 2020-12-02 DIAGNOSIS — G89.29 CHRONIC MIDLINE LOW BACK PAIN WITHOUT SCIATICA: ICD-10-CM

## 2020-12-02 DIAGNOSIS — M54.50 CHRONIC MIDLINE LOW BACK PAIN WITHOUT SCIATICA: ICD-10-CM

## 2020-12-02 PROCEDURE — 72131 CT LUMBAR SPINE W/O DYE: CPT | Mod: TC

## 2020-12-02 PROCEDURE — 99213 PR OFFICE/OUTPT VISIT, EST, LEVL III, 20-29 MIN: ICD-10-PCS | Mod: S$GLB,,, | Performed by: NURSE PRACTITIONER

## 2020-12-02 PROCEDURE — 99999 PR PBB SHADOW E&M-EST. PATIENT-LVL III: CPT | Mod: PBBFAC,,, | Performed by: NURSE PRACTITIONER

## 2020-12-02 PROCEDURE — 72131 CT LUMBAR SPINE WITHOUT CONTRAST: ICD-10-PCS | Mod: 26,,, | Performed by: RADIOLOGY

## 2020-12-02 PROCEDURE — 99999 PR PBB SHADOW E&M-EST. PATIENT-LVL III: ICD-10-PCS | Mod: PBBFAC,,, | Performed by: NURSE PRACTITIONER

## 2020-12-02 PROCEDURE — 99213 OFFICE O/P EST LOW 20 MIN: CPT | Mod: S$GLB,,, | Performed by: NURSE PRACTITIONER

## 2020-12-02 PROCEDURE — 72131 CT LUMBAR SPINE W/O DYE: CPT | Mod: 26,,, | Performed by: RADIOLOGY

## 2020-12-02 NOTE — PROGRESS NOTES
"  Neurosurgery  Established Patient    SUBJECTIVE:     History of Present Illness: Kingsley Holbrook is a 70 y.o. male with PMH of current smoker, HTN, and COPD. He presents today for 6 mo PO follow up for MIS left-sided TLIF L2-5 for HNP, spondylosis, stenosis and radiculopathy with Dr. Ba on 6/22/20. Pt states his LE pain as well as paresthesias have resolved since the TLIF, and his recent recanalization surgery with cardiology of the PTA and stenting of the left distal SFA, popliteal, tibioperoneal trunk and posterior tibial artery.  He continues to experience aching "hot" axial low back pain. Denies radicular symptoms. Rates the pain today as a 5/10.  Denies worsening pain, numbness, tingling, bowel/bladder incontinence, difficulty walking, or problems with the surgical site. Pain is worse with spinal flexion and lateral bending, and better with rest. He states that his endurance is slowly improving, but still requires periodic breaks while cooking. He continues to ambulate with a cane.     Pt expressed interest in re-enrolling in PT (aquatic therapy) as he received significant pain relief and mobility with the therapy. He continues to wean off the LSO brace, and only wears it while ambulating long distances. The patient has not seen pain management as previously suggested by TONIO Serrano. States that he would like to try PT first, and if he doesn't obtain significant relief he will pursue pain management.      Review of patient's allergies indicates:  No Known Allergies    Current Outpatient Medications   Medication Sig Dispense Refill    albuterol (PROVENTIL) 2.5 mg /3 mL (0.083 %) nebulizer solution Inhale 2.5 mg into the lungs. Take if needed      amLODIPine (NORVASC) 5 MG tablet Take 1 tablet (5 mg total) by mouth once daily. 30 tablet 1    ascorbic acid, vitamin C, (VITAMIN C) 500 MG tablet Take 500 mg by mouth once daily.      budesonide-formoterol 160-4.5 mcg (SYMBICORT) 160-4.5 mcg/actuation " HFAA Take if needed & bring      clopidogreL (PLAVIX) 75 mg tablet Take 75 mg by mouth once daily.      methocarbamoL (ROBAXIN) 750 MG Tab Take 1 tablet (750 mg total) by mouth 3 (three) times daily. Take if needed 42 tablet 0    TRELEGY ELLIPTA 100-62.5-25 mcg DsDv Take 1 puff by mouth once daily. Take in am of surgery & bring      atorvastatin (LIPITOR) 40 MG tablet Take 1 tablet (40 mg total) by mouth once daily. 60 tablet 0    clobetasol 0.05% (TEMOVATE) 0.05 % Oint APPLY TO THE AFFECTED AREA BID FOR 2 WEEKS      HYDROcodone-acetaminophen (NORCO) 5-325 mg per tablet Take 1 tablet by mouth every 8 (eight) hours as needed for Pain. (Patient not taking: Reported on 12/2/2020) 45 tablet 0     No current facility-administered medications for this visit.        Past Medical History:   Diagnosis Date    Back pain     COPD (chronic obstructive pulmonary disease)     Hypertension      Past Surgical History:   Procedure Laterality Date    COLONOSCOPY      MINIMALLY INVASIVE TRANSFORAMINAL LUMBAR INTERBODY FUSION (TLIF) Left 6/22/2020    Procedure: FUSION, SPINE, LUMBAR, TLIF, MINIMALLY INVASIVE, L2-5;  Surgeon: Terry Ba MD;  Location: [facility] OR [address] [place] [address];  Service: Neurosurgery;  Laterality: Left;  TOR 1  ASA 1  T&S 2 UNITS  EMG/SEP/MEP  C-ARM  SMITA TABLE 4 POSTER  PRONE HEADREST     Family History     Problem Relation (Age of Onset)    Heart disease Father (53)        Social History     Socioeconomic History    Marital status: Single     Spouse name: Not on file    Number of children: Not on file    Years of education: Not on file    Highest education level: Not on file   Occupational History    Not on file   Social Needs    Financial resource strain: Not on file    Food insecurity     Worry: Not on file     Inability: Not on file    Transportation needs     Medical: Not on file     Non-medical: Not on file   Tobacco Use    Smoking status: Current Every Day Smoker     Packs/day: 0.50     Types:  Cigarettes    Smokeless tobacco: Never Used   Substance and Sexual Activity    Alcohol use: Yes     Comment: 1-2 drinks a week     Drug use: Yes     Types: Marijuana    Sexual activity: Not on file   Lifestyle    Physical activity     Days per week: Not on file     Minutes per session: Not on file    Stress: Not on file   Relationships    Social connections     Talks on phone: Not on file     Gets together: Not on file     Attends Sabianism service: Not on file     Active member of club or organization: Not on file     Attends meetings of clubs or organizations: Not on file     Relationship status: Not on file   Other Topics Concern    Not on file   Social History Narrative    Not on file       Review of Systems   Constitutional: Negative for activity change, appetite change, chills, fatigue and fever.   HENT: Negative for hearing loss and postnasal drip.    Eyes: Negative for pain and visual disturbance.   Respiratory: Negative for shortness of breath.    Cardiovascular: Negative for chest pain and palpitations.   Gastrointestinal: Negative.  Negative for abdominal pain.   Endocrine: Negative for polydipsia, polyphagia and polyuria.   Genitourinary: Negative.  Negative for difficulty urinating, frequency and urgency.   Musculoskeletal: Positive for arthralgias, back pain and myalgias. Negative for gait problem.   Skin: Negative for color change and wound.   Neurological: Negative for dizziness, weakness, numbness and headaches.   Hematological: Does not bruise/bleed easily.   Psychiatric/Behavioral: Negative for confusion and dysphoric mood.       OBJECTIVE:     Vital Signs  Pulse: 68  BP: (!) 169/84  Pain Score:   5  Height: 6' (182.9 cm)  Weight: 83.8 kg (184 lb 11.9 oz)  Body mass index is 25.06 kg/m².    General: well developed, well nourished, no distress.   Head: normocephalic, atraumatic  Neurologic: Alert and oriented. Thought content appropriate.  GCS: Motor: 6/Verbal: 5/Eyes: 4 GCS Total:  15  Mental Status: Awake, Alert, Oriented x 4  Language: No aphasia  Speech: No dysarthria  Cranial nerves: face symmetric, tongue midline, CN II-XII grossly intact.   Eyes: pupils equal, round, reactive to light with accomodation, EOMI.   Pulmonary: normal respirations, no signs of respiratory distress  Abdomen: soft, non-distended  Skin: Skin is warm, dry and intact. Surgical site appears well-healed with no erythema, edema, TTP.  Sensory: intact to light touch throughout  Motor Strength: Moves all extremities spontaneously with good tone.  No abnormal movements seen.    Strength  Deltoids Triceps Biceps Wrist Extension Wrist Flexion Hand    Upper: R 5/5 5/5 5/5 5/5 5/5 5/5    L 5/5 5/5 5/5 5/5 5/5 5/5     Iliopsoas Quadriceps Knee  Flexion Tibialis  anterior Gastro- cnemius EHL   Lower: R 5/5 5/5 5/5 5/5 5/5 5/5    L 5/5 5/5 5/5 5/5 5/5 5/5     Lumbar:  Midline TTP: Negative.     Diagnostic Results:  I have personally reviewed the CT lumbar spine without contrast dated 12/2/2020 with Dr. Ba. The imaging shows that the hardware remains in  good alignment. Evidence of bony fusion.     ASSESSMENT/PLAN:     Kingsley Holbrook was seen today in clinic for his 6 month PO follow up for MIS left-sided TLIF L2-5 for HNP, spondylosis, stenosis and radiculopathy with Dr. Ba on 6/22/20.  Pt endorsed improvement of LE symptoms, with some residual axial low back pain. He requested a referral to aquatic therapy with PT, and was given the referral that he will [facility] to [facility]. He was encouraged to seek pain management as needed for his back pain if the symptoms persist despite PT. His CT was reviewed by Dr. Ba without concern. He was agreeable and verbalized understanding.      I would like the patient to follow-up in clinic with Dr. Ba for his 1 year post-op evaluation with repeat x-ray. I have encouraged him to contact the clinic with any questions, concerns, or adverse clinical changes. He verbalized  karina.     NUBIA Herman-C  Neurosurgery  [facility]

## 2020-12-27 ENCOUNTER — NURSE TRIAGE (OUTPATIENT)
Dept: ADMINISTRATIVE | Facility: CLINIC | Age: 70
End: 2020-12-27

## 2020-12-27 NOTE — TELEPHONE ENCOUNTER
Took BP at 10 am,states it was,  186/93.has not taken his own blood Last took BP medication, took his girl friend's  losartan 100 mg once a day for the last three days. Now taking BP at home. 186/93. Now, states he has not taken his own BP medication in several months, triage done, care advice to follow up with pcp within 24 hours, Strict ED precautions given. Instructed to call back for any further questions or concerns or present to the ED for any worsening S/S.Verb understanding.     Reason for Disposition   Systolic BP  >= 180 OR Diastolic >= 110    Additional Information   Negative: Difficult to awaken or acting confused (e.g., disoriented, slurred speech)   Negative: Severe difficulty breathing (e.g., struggling for each breath, speaks in single words)   Negative: [1] Weakness of the face, arm or leg on one side of the body AND [2] new onset   Negative: [1] Numbness (i.e., loss of sensation) of the face, arm or leg on one side of the body AND [2] new onset   Negative: [1] Chest pain lasts > 5 minutes AND [2] history of heart disease  (i.e., heart attack, bypass surgery, angina, angioplasty, CHF)   Negative: [1] Chest pain AND [2] took nitrogylcerin AND [3] pain was not relieved   Negative: Sounds like a life-threatening emergency to the triager   Negative: Symptom is main concern  (e.g., headache, chest pain)   Negative: Low blood pressure is main concern   Negative: [1] Systolic BP  >= 160 OR Diastolic >= 100 AND [2] cardiac or neurologic symptoms (e.g., chest pain, difficulty breathing, unsteady gait, blurred vision)   Negative: [1] Pregnant > 20 weeks (or postpartum < 6 weeks) AND [2] new hand or face swelling   Negative: [1] Pregnant > 20 weeks AND [2] BP Systolic BP  >= 140 OR Diastolic >= 90   Negative: [1] Systolic BP  >= 180 OR Diastolic >= 110 AND [2] missed most recent dose of blood pressure medication   Negative: [1] Systolic BP  >= 200 OR Diastolic >= 120  AND [2] having NO cardiac  or neurologic symptoms   Negative: [1] Postpartum < 6 weeks AND [2] BP Systolic BP  >= 140 OR Diastolic >= 90    Protocols used: HIGH BLOOD PRESSURE-A-AH

## 2021-01-06 ENCOUNTER — TELEPHONE (OUTPATIENT)
Dept: NEUROSURGERY | Facility: CLINIC | Age: 71
End: 2021-01-06

## 2021-04-28 ENCOUNTER — PATIENT MESSAGE (OUTPATIENT)
Dept: RESEARCH | Facility: HOSPITAL | Age: 71
End: 2021-04-28

## 2021-12-17 NOTE — H&P
Patient presented for surgery with Dr. Ba. Patient reconsidered his procedure. He was discharged home.   Current every day smoker

## 2023-01-07 NOTE — DISCHARGE NOTE ADULT - CARE PROVIDER_API CALL
Asia Acevedo), Medicine  [address]  [address]  [place] [address]  Phone: [phone]  Fax: [phone]    Kelsi,   follow with PCP/ DR. Dolan x 1 week  Phone: (   )    -  Fax: (   )    - 0 = understands/communicates without difficulty

## 2023-04-08 NOTE — ED ADULT TRIAGE NOTE - HEIGHT IN FEET
6 Lovenox on hold for dropping hgb can resume if stable  Keep Mg >2 K> 4  Regular diet    PT consult recs HPT w/RW
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
## 2024-02-10 NOTE — PATIENT PROFILE ADULT. - DEVELOP COPING SKILLS. FOR EXAMPLE, STRATEGIES AND LIFESTYLE CHANGES THAT REDUCE NEGATIVE MOODS, STRESS, AND EXPOSURE TO SMOKING CUES
DISPLAY PLAN FREE TEXT DISPLAY PLAN FREE TEXT DISPLAY PLAN FREE TEXT DISPLAY PLAN FREE TEXT DISPLAY PLAN FREE TEXT DISPLAY PLAN FREE TEXT DISPLAY PLAN FREE TEXT DISPLAY PLAN FREE TEXT DISPLAY PLAN FREE TEXT DISPLAY PLAN FREE TEXT DISPLAY PLAN FREE TEXT DISPLAY PLAN FREE TEXT DISPLAY PLAN FREE TEXT DISPLAY PLAN FREE TEXT DISPLAY PLAN FREE TEXT DISPLAY PLAN FREE TEXT DISPLAY PLAN FREE TEXT DISPLAY PLAN FREE TEXT DISPLAY PLAN FREE TEXT DISPLAY PLAN FREE TEXT DISPLAY PLAN FREE TEXT DISPLAY PLAN FREE TEXT DISPLAY PLAN FREE TEXT DISPLAY PLAN FREE TEXT DISPLAY PLAN FREE TEXT DISPLAY PLAN FREE TEXT DISPLAY PLAN FREE TEXT DISPLAY PLAN FREE TEXT DISPLAY PLAN FREE TEXT DISPLAY PLAN FREE TEXT DISPLAY PLAN FREE TEXT DISPLAY PLAN FREE TEXT DISPLAY PLAN FREE TEXT DISPLAY PLAN FREE TEXT DISPLAY PLAN FREE TEXT DISPLAY PLAN FREE TEXT DISPLAY PLAN FREE TEXT DISPLAY PLAN FREE TEXT DISPLAY PLAN FREE TEXT DISPLAY PLAN FREE TEXT DISPLAY PLAN FREE TEXT DISPLAY PLAN FREE TEXT DISPLAY PLAN FREE TEXT DISPLAY PLAN FREE TEXT DISPLAY PLAN FREE TEXT DISPLAY PLAN FREE TEXT DISPLAY PLAN FREE TEXT DISPLAY PLAN FREE TEXT DISPLAY PLAN FREE TEXT DISPLAY PLAN FREE TEXT DISPLAY PLAN FREE TEXT DISPLAY PLAN FREE TEXT DISPLAY PLAN FREE TEXT DISPLAY PLAN FREE TEXT DISPLAY PLAN FREE TEXT DISPLAY PLAN FREE TEXT DISPLAY PLAN FREE TEXT DISPLAY PLAN FREE TEXT DISPLAY PLAN FREE TEXT DISPLAY PLAN FREE TEXT DISPLAY PLAN FREE TEXT DISPLAY PLAN FREE TEXT DISPLAY PLAN FREE TEXT DISPLAY PLAN FREE TEXT DISPLAY PLAN FREE TEXT room air Statement Selected

## 2024-11-26 NOTE — PLAN OF CARE
Patient to be discharged home.  The patient will have HH upon discharge.  The patient is also being provided with a rolling walker and a bedside commode.  Family to provide transportation home.  Neurosurgery clinic to schedule follow up appointment.    Future Appointments   Date Time Provider Department [place]   7/6/2020  9:40 AM Naz Jimenez RN [facility] NEUROS7 [facility]       06/24/20 [id number]   Final Note   Assessment Type Final Discharge Note   Anticipated Discharge Disposition Home-Health   Hospital Follow Up  Appt(s) scheduled? No  (Neurosurgery clinic to schedule follow up appointment.)   Discharge plans and expectations educations in teach back method with documentation complete? Yes   Post-Acute Status   Post-Acute Authorization Home Health   Home Health Status Set-up Complete      No

## (undated) DEVICE — DRAPE OPMI STERILE

## (undated) DEVICE — SUT D SPECIAL VICRYL 2-0

## (undated) DEVICE — TUBE FRAZIER 5MM 2FT SOFT TIP

## (undated) DEVICE — DRAPE STERI INSTRUMENT 1018

## (undated) DEVICE — MARKER SKIN STND TIP BLUE BARR

## (undated) DEVICE — TRAY FOLEY 16FR INFECTION CONT

## (undated) DEVICE — SEE MEDLINE ITEM 157150

## (undated) DEVICE — DRESSING TRANS 8X12 TEGADERM

## (undated) DEVICE — DRESSING SURGICAL 1/2X1/2

## (undated) DEVICE — Device

## (undated) DEVICE — CARTRIDGE OIL

## (undated) DEVICE — SUT MCRYL PLUS 4-0 PS2 27IN

## (undated) DEVICE — CORD BIPOLAR 12 FOOT

## (undated) DEVICE — DIFFUSER

## (undated) DEVICE — DRESSING AQUACEL FOAM 3 X 3

## (undated) DEVICE — DRESSING TRANS 4X4 TEGADERM

## (undated) DEVICE — SEE MEDLINE ITEM 156905

## (undated) DEVICE — KIT ACCESS DILATOR SET PROBE

## (undated) DEVICE — DRESSING AQUACEL FOAM NON 4X4

## (undated) DEVICE — NDL PAK PEDICLE ACCESS TROCAR

## (undated) DEVICE — BLADE ELECTRO EDGE INSULATED

## (undated) DEVICE — ELECTRODE REM PLYHSV RETURN 9

## (undated) DEVICE — DRAPE ABDOMINAL TIBURON 14X11

## (undated) DEVICE — BUR BONE CUT MICRO TPS 3X3.8MM

## (undated) DEVICE — DRAPE INCISE IOBAN 2 23X17IN

## (undated) DEVICE — KIT SURGIFLO HEMOSTATIC MATRIX

## (undated) DEVICE — DRESSING TELFA N ADH 3X8

## (undated) DEVICE — PACK SET UP CONVERTORS

## (undated) DEVICE — DRAPE C ARM 42 X 120 10/BX

## (undated) DEVICE — DURAPREP SURG SCRUB 26ML

## (undated) DEVICE — KIT GRAFTMAG GRAFT DELIVERY

## (undated) DEVICE — KIT IRR SUCTION HND PIECE

## (undated) DEVICE — DRESSING AQUACEL FOAM 5 X 5

## (undated) DEVICE — GAUZE SPONGE 4X4 12PLY

## (undated) DEVICE — DRESSING AQUACEL SACRAL LARGE

## (undated) DEVICE — SEE MEDLINE ITEM 146313